# Patient Record
Sex: MALE | Race: WHITE | NOT HISPANIC OR LATINO | Employment: FULL TIME | URBAN - NONMETROPOLITAN AREA
[De-identification: names, ages, dates, MRNs, and addresses within clinical notes are randomized per-mention and may not be internally consistent; named-entity substitution may affect disease eponyms.]

---

## 2018-02-08 ENCOUNTER — OFFICE VISIT (OUTPATIENT)
Dept: FAMILY MEDICINE | Facility: OTHER | Age: 48
End: 2018-02-08

## 2018-02-08 VITALS
HEIGHT: 67 IN | OXYGEN SATURATION: 100 % | RESPIRATION RATE: 20 BRPM | SYSTOLIC BLOOD PRESSURE: 108 MMHG | DIASTOLIC BLOOD PRESSURE: 72 MMHG | TEMPERATURE: 96.9 F | WEIGHT: 149.2 LBS | HEART RATE: 105 BPM | BODY MASS INDEX: 23.42 KG/M2

## 2018-02-08 DIAGNOSIS — K62.5 RECTAL BLEEDING: ICD-10-CM

## 2018-02-08 DIAGNOSIS — R10.84 ABDOMINAL PAIN, GENERALIZED: ICD-10-CM

## 2018-02-08 DIAGNOSIS — R19.7 DIARRHEA, UNSPECIFIED TYPE: ICD-10-CM

## 2018-02-08 DIAGNOSIS — R21 RASH AND NONSPECIFIC SKIN ERUPTION: Primary | ICD-10-CM

## 2018-02-08 PROCEDURE — 99213 OFFICE O/P EST LOW 20 MIN: CPT | Performed by: NURSE PRACTITIONER

## 2018-02-08 RX ORDER — TRIAMCINOLONE ACETONIDE 1 MG/G
CREAM TOPICAL
Qty: 80 G | Refills: 0 | Status: SHIPPED | OUTPATIENT
Start: 2018-02-08 | End: 2021-03-26

## 2018-02-08 NOTE — PROGRESS NOTES
SUBJECTIVE:   Misha Nicole is a 47 year old male who presents to clinic today for the following health issues:      Gastrointestinal symptoms      Duration: 3-4 years    Description:           BLEEDING - bright red blood in the stool, bright red blood when wiping and water is red.      Intensity:  severe    Accompanying signs and symptoms:  loose stools, mucus in stools, painful bowel movements and bloating    History  Previous {similar problem: no   Previous evaluation:  none    Aggravating factors: meals and bowel movements    Alleviating factors: nothing    Other Therapies tried: None    He has been having abdominal pains, bloating, cramping and intermittent diarrhea and constipation for 3-4 years.  It is worsening and is now to the point of fecal incontinence and mucus discharge.  He is also having bright red bleeding with most stools and occasionally in between.   Drinks 1 pot of coffee a day.  Has not tried any dietary changes.  Works as an over the road .   Weight loss - he is unsure how much, but family has commented on this recently.      DERM/SKIN  -dry skin patches  -feet, legs, elbows    Diagnosed with psoriasis in the past.  Has never taken medications for this.        Problem list and histories reviewed & adjusted, as indicated.  Additional history: none    There is no problem list on file for this patient.    No past surgical history on file.    Social History   Substance Use Topics     Smoking status: Current Every Day Smoker     Packs/day: 0.75     Types: Cigarettes     Smokeless tobacco: Former User     Alcohol use Yes     No family history on file.      No current outpatient prescriptions on file.     Allergies   Allergen Reactions     No Known Drug Allergies      BP Readings from Last 3 Encounters:   02/08/18 108/72   10/14/04 98/60   02/27/03 122/88    Wt Readings from Last 3 Encounters:   02/08/18 149 lb 3.2 oz (67.7 kg)   10/14/04 149 lb (67.6 kg)   02/27/03 165 lb (74.8 kg)     "                Reviewed and updated as needed this visit by clinical staff  Tobacco  Allergies  Meds  Soc Hx      Reviewed and updated as needed this visit by Provider         ROS:  Constitutional, HEENT, cardiovascular, pulmonary, gi and gu systems are negative, except as otherwise noted.    OBJECTIVE:     /72  Pulse 105  Temp 96.9  F (36.1  C) (Tympanic)  Resp 20  Ht 5' 6.5\" (1.689 m)  Wt 149 lb 3.2 oz (67.7 kg)  SpO2 100%  BMI 23.72 kg/m2  Body mass index is 23.72 kg/(m^2).  GENERAL: alert, no distress and appears older than stated age  NECK: no adenopathy, no asymmetry, masses, or scars and thyroid normal to palpation  RESP: lungs clear to auscultation - no rales, rhonchi or wheezes  CV: regular rate and rhythm, normal S1 S2, no S3 or S4, no murmur, click or rub, no peripheral edema and peripheral pulses strong  ABDOMEN: soft,without hepatosplenomegaly or masses, tenderness LLQ and bowel sounds normal  MS: no gross musculoskeletal defects noted, no edema  SKIN: dry, scaling skin on right leg in several patches.      Diagnostic Test Results:  none     ASSESSMENT/PLAN:       1. Rash and nonspecific skin eruption  Will refer to dermatology for definitive diagnosis and treatment if this is psoriasis.  I did give him a steroid cream to use in the meantime.   - triamcinolone (KENALOG) 0.1 % cream; Apply sparingly to affected area three times daily as needed  Dispense: 80 g; Refill: 0  - DERMATOLOGY REFERRAL    2. Rectal bleeding  He needs a colonoscopy as soon as possible given his symptoms.  Unfortunately, he does not have insurance right now.  He just started a new job and thinks he will have coverage in about 2 months.  Will schedule as soon as he is able.   - GASTROENTEROLOGY ADULT REF PROCEDURE ONLY [facility] [phone]; No Provider Preference    3. Diarrhea, unspecified type  He needs a colonoscopy as soon as possible given his symptoms.  Unfortunately, he does not have insurance " right now.  He just started a new job and thinks he will have coverage in about 2 months.  Will schedule as soon as he is able.  He is going to use small amounts of Imodium PRN in the meantime.   - GASTROENTEROLOGY ADULT REF PROCEDURE ONLY [facility][note 3] [phone]; No Provider Preference    4. Abdominal pain, generalized  He needs a colonoscopy as soon as possible given his symptoms.  Unfortunately, he does not have insurance right now.  He just started a new job and thinks he will have coverage in about 2 months.  Will schedule as soon as he is able.  We did not do any further work up today due to his lack of insurance.  Will do further evaluation once that gets in place.   - GASTROENTEROLOGY ADULT REF PROCEDURE ONLY [facility][note 3] [phone]; No Provider Preference    See Patient Instructions    WILEY Waddell [facility]

## 2018-02-08 NOTE — MR AVS SNAPSHOT
After Visit Summary   2/8/2018    Misha Nicole    MRN: [id number]           Patient Information     Date Of Birth          1970        Visit Information        Provider Department      2/8/2018 7:00 AM Gail Vargas APRN [facility]        Today's Diagnoses     Rash and nonspecific skin eruption    -  1    Rectal bleeding        Diarrhea, unspecified type        Abdominal pain, generalized          Care Instructions    Use the steroid cream for your rash.      See Dermatology when you can.    Have the colonoscopy once you get insurance.     You can try Imodium in the meantime to help with diarrhea.                   Follow-ups after your visit        Additional Services     DERMATOLOGY REFERRAL       Your provider has referred you to: FMG: [facility] [phone]   [email or web address]  UMP: [facility] [phone]   [email or web address]    Or [facility]    Please be aware that coverage of these services is subject to the terms and limitations of your health insurance plan.  Call member services at your health plan with any benefit or coverage questions.      Please bring the following with you to your appointment:    (1) Any X-Rays, CTs or MRIs which have been performed.  Contact the facility where they were done to arrange for  prior to your scheduled appointment.    (2) List of current medications  (3) This referral request   (4) Any documents/labs given to you for this referral            GASTROENTEROLOGY ADULT REF PROCEDURE ONLY [facility] [phone]; No Provider Preference       Last Lab Result: No results found for: CR  Body mass index is 23.72 kg/(m^2).     Needed:  No  Language:  English    Patient will be contacted to schedule procedure.     Please be aware that coverage of these services is subject to  "the terms and limitations of your health insurance plan.  Call member services at your health plan with any benefit or coverage questions.  Any procedures must be performed at a [place] facility OR coordinated by your clinic's referral office.    Please bring the following with you to your appointment:    (1) Any X-Rays, CTs or MRIs which have been performed.  Contact the facility where they were done to arrange for  prior to your scheduled appointment.    (2) List of current medications   (3) This referral request   (4) Any documents/labs given to you for this referral                  Who to contact     If you have questions or need follow up information about today's clinic visit or your schedule please contact [facility] directly at [phone].  Normal or non-critical lab and imaging results will be communicated to you by MyChart, letter or phone within 4 business days after the clinic has received the results. If you do not hear from us within 7 days, please contact the clinic through [employer] or phone. If you have a critical or abnormal lab result, we will notify you by phone as soon as possible.  Submit refill requests through [employer] or call your pharmacy and they will forward the refill request to us. Please allow 3 business days for your refill to be completed.          Additional Information About Your Visit        [employer] Information     [employer] lets you send messages to your doctor, view your test results, renew your prescriptions, schedule appointments and more. To sign up, go to [email or web address] . Click on \"Log in\" on the left side of the screen, which will take you to the Welcome page. Then click on \"Sign up Now\" on the right side of the page.     You will be asked to enter the access code listed below, as well as some personal information. Please follow the directions to create your username and password.     Your access code is: [email or web address]  Expires: 5/9/2018  7:42 " "AM     Your access code will  in 90 days. If you need help or a new code, please call your [facility] clinic or [phone].        Care EveryWhere ID     This is your Care EveryWhere ID. This could be used by other organizations to access your [facility] medical records  [id number]        Your Vitals Were     Pulse Temperature Respirations Height Pulse Oximetry BMI (Body Mass Index)    105 96.9  F (36.1  C) (Tympanic) 20 5' 6.5\" (1.689 m) 100% 23.72 kg/m2       Blood Pressure from Last 3 Encounters:   18 108/72   10/14/04 98/60   03 122/88    Weight from Last 3 Encounters:   18 149 lb 3.2 oz (67.7 kg)   10/14/04 149 lb (67.6 kg)   03 165 lb (74.8 kg)              We Performed the Following     DERMATOLOGY REFERRAL     GASTROENTEROLOGY ADULT REF PROCEDURE ONLY [facility] [phone]; No Provider Preference          Today's Medication Changes          These changes are accurate as of 18  7:43 AM.  If you have any questions, ask your nurse or doctor.               Start taking these medicines.        Dose/Directions    triamcinolone 0.1 % cream   Commonly known as:  KENALOG   Used for:  Rash and nonspecific skin eruption   Started by:  Gail Vargas APRN CNP        Apply sparingly to affected area three times daily as needed   Quantity:  80 g   Refills:  0            Where to get your medicines      These medications were sent to [facility] [address]   [address] [facility] [address]     Phone:  [phone]     triamcinolone 0.1 % cream                Primary Care Provider Fax #    Physician No Ref-Primary [phone]       No address on file        Equal Access to Services     ALLISON GLEZ AH: Mirta Anaya, ruba sanchez, stevan kaalmada saad, chris smith. So [facility] [phone].    ATENCIÓN: Si habla español, tiene a vo disposición servicios gratuitos de asistencia lingüística. Llame al " [phone].    We comply with applicable federal civil rights laws and [place] laws. We do not discriminate on the basis of race, color, national origin, age, disability, sex, sexual orientation, or gender identity.            Thank you!     Thank you for choosing [facility]  for your care. Our goal is always to provide you with excellent care. Hearing back from our patients is one way we can continue to improve our services. Please take a few minutes to complete the written survey that you may receive in the mail after your visit with us. Thank you!             Your Updated Medication List - Protect others around you: Learn how to safely use, store and throw away your medicines at www.disposemymeds.org.          This list is accurate as of 2/8/18  7:43 AM.  Always use your most recent med list.                   Brand Name Dispense Instructions for use Diagnosis    triamcinolone 0.1 % cream    KENALOG    80 g    Apply sparingly to affected area three times daily as needed    Rash and nonspecific skin eruption

## 2018-02-08 NOTE — NURSING NOTE
"Chief Complaint   Patient presents with     Gastrointestinal Problem     loose stools, bleeding, gassy     Derm Problem     dry skin patches on feet, legs, elbows       Initial /72  Pulse 105  Temp 96.9  F (36.1  C) (Tympanic)  Resp 20  Ht 5' 6.5\" (1.689 m)  Wt 149 lb 3.2 oz (67.7 kg)  SpO2 100%  BMI 23.72 kg/m2 Estimated body mass index is 23.72 kg/(m^2) as calculated from the following:    Height as of this encounter: 5' 6.5\" (1.689 m).    Weight as of this encounter: 149 lb 3.2 oz (67.7 kg).  Medication Reconciliation: complete   ................Abelino Armstrong LPN,   February 8, 2018,      7:19 AM,   [facility]    "

## 2018-02-08 NOTE — PATIENT INSTRUCTIONS
Use the steroid cream for your rash.      See Dermatology when you can.    Have the colonoscopy once you get insurance.     You can try Imodium in the meantime to help with diarrhea.

## 2018-02-09 ENCOUNTER — TELEPHONE (OUTPATIENT)
Dept: FAMILY MEDICINE | Facility: OTHER | Age: 48
End: 2018-02-09

## 2018-02-09 NOTE — LETTER

## 2018-02-09 NOTE — LETTER
[facility]  [address] [facility] [address]  [phone]        February 9, 2018    Misha Nicole  [address] [facility] [address]

## 2018-04-03 ENCOUNTER — TELEPHONE (OUTPATIENT)
Dept: FAMILY MEDICINE | Facility: CLINIC | Age: 48
End: 2018-04-03

## 2018-04-03 ENCOUNTER — HOSPITAL ENCOUNTER (OUTPATIENT)
Facility: CLINIC | Age: 48
Discharge: HOME | End: 2018-04-03
Attending: SURGERY | Admitting: SURGERY
Payer: COMMERCIAL

## 2018-04-03 ENCOUNTER — SURGERY (OUTPATIENT)
Age: 48
End: 2018-04-03

## 2018-04-03 VITALS
RESPIRATION RATE: 16 BRPM | DIASTOLIC BLOOD PRESSURE: 77 MMHG | OXYGEN SATURATION: 100 % | TEMPERATURE: 98.2 F | SYSTOLIC BLOOD PRESSURE: 124 MMHG

## 2018-04-03 DIAGNOSIS — R07.9 CHEST PAIN, UNSPECIFIED TYPE: Primary | ICD-10-CM

## 2018-04-03 PROCEDURE — 25000128 H RX IP 250 OP 636: Performed by: NURSE ANESTHETIST, CERTIFIED REGISTERED

## 2018-04-03 PROCEDURE — 40000879 ZZH CANCELLED SURGERY UP TO 16-30 MINS

## 2018-04-03 PROCEDURE — 25000125 ZZHC RX 250: Performed by: NURSE ANESTHETIST, CERTIFIED REGISTERED

## 2018-04-03 RX ORDER — LIDOCAINE 40 MG/G
CREAM TOPICAL
Status: DISCONTINUED | OUTPATIENT
Start: 2018-04-03 | End: 2018-04-03 | Stop reason: HOSPADM

## 2018-04-03 RX ORDER — ONDANSETRON 2 MG/ML
4 INJECTION INTRAMUSCULAR; INTRAVENOUS
Status: DISCONTINUED | OUTPATIENT
Start: 2018-04-03 | End: 2018-04-03 | Stop reason: HOSPADM

## 2018-04-03 RX ORDER — SODIUM CHLORIDE, SODIUM LACTATE, POTASSIUM CHLORIDE, CALCIUM CHLORIDE 600; 310; 30; 20 MG/100ML; MG/100ML; MG/100ML; MG/100ML
INJECTION, SOLUTION INTRAVENOUS CONTINUOUS
Status: DISCONTINUED | OUTPATIENT
Start: 2018-04-03 | End: 2018-04-03 | Stop reason: HOSPADM

## 2018-04-03 RX ADMIN — SODIUM CHLORIDE, POTASSIUM CHLORIDE, SODIUM LACTATE AND CALCIUM CHLORIDE: 600; 310; 30; 20 INJECTION, SOLUTION INTRAVENOUS at 07:57

## 2018-04-03 RX ADMIN — LIDOCAINE HYDROCHLORIDE 1 ML: 10 INJECTION, SOLUTION EPIDURAL; INFILTRATION; INTRACAUDAL at 07:57

## 2018-04-03 NOTE — TELEPHONE ENCOUNTER
": 1970  PHONE #'s: [phone] (home)     PRESENTING PROBLEM:  Rectal bleeding, intermittent for months , alternating with constipation. Pt was supposed to see Dr. Cook for a colonoscopy today as well as Dr. Richmond in clinic for these Sx, but he had to cancel due to having diarrhea and abd pains from the prep. He feels like he can't leave the bathroom to come in for either appt.     NURSING ASSESSMENT:  I started asking questions about his health. He basically left [place] years ago- moved to [place]. He did not seek medical care there for financial reasons. He said he couldn't afford it. He returned to [place] in 2017. He has ONLY seen Gail Vargas, at [facility],to discuss his concerns, 18. He was scheduled today for the colonoscopy.  \" Dr. 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 I have a double heart murmur\"   He says he does NOT remember the name of the cardiologist he saw in [place] for this. HE was last seen in  for this. He thinks the last time he saw a provider in [place] was .  NOTE: He was not a good historian. He didn't recall names of clinics or providers whom he has seen in the past. He didn't think he would even know how to contact to tget the information for release of medical information.    Description:  He said he has had bright red blood with his BMs that will fill up the toilet bowl water to look all red.  This has been going on for some time now. His last bloody stool was yesterday. He has been cutting kotex pads in half and placing in the rectum and into his underpants. Wearing 2 pairs of underwear as he said he will soak through. The pad He is leaking blood from his rectum without BMs now. ( he was embarrassed to tell me that)  Onset/duration:  \" Months\"  He alternated between constipation issues, too. \" I hadn't pooped for 3 weeks . I just kept taking oral stool softeners until something worked finally " "Didn't seek medical care. .   Precip. factors:   Etiology unknown  Assoc. Sx:   Tired out,  Has constant abdominal pain.  He has lost 25 to 30 pounds in the last while.( since September?)  \" I used to weigh 185# to 190 # in 2016.  Improves/worsens Sx:  WORSE.  Pain scale (1-10)   3/10 right now.   Sx specific meds:  NONE, \" Just that prep I had to take for the colonscopy. \"   Last exam/Tx:  2/8/18 Gail Vargas at [facility].     RECOMMENDED DISPOSITION:  To ED, another person to drive - or call 911 if you have NO mode of transportation. Gave him the number to the [employer] bus service for [facility] residenc: [phone] or can call [phone].    Will comply with recommendation: YES  If further questions/concerns or if Sx do not improve, worsen or new Sx develop, call your PCP or [facility] as soon as possible.    NOTES:  Disposition was determined by the first positive assessment question, therefore all previous assessment questions were negative.  Informed to check provider manual or call insurance company to assure coverage.    Guideline used: Rectal bleeding / Abdominal Pain- Adult.  Telephone Triage Protocols for Nurses, Fifth Edition, Melania Limon RN  April 3, 2018    "

## 2018-04-03 NOTE — OR NURSING
After Dr. Cook saw pt. He decided to cancel procedure & have pt. See physician today.  Appt. Mad with Dr. Richmond today at 1120 here at [place].  Pt. Strongly encouraged to attend the appt. to start cardiac workup. Bryant MACE CAPA

## 2018-04-03 NOTE — INTERIM SUMMARY
Misha came in today prepped for a colonoscopy due to some recent GI issues. He admits to some significant cardiac issues in the past. He states that he has been recommended to have stents and even a potential heart transplant candidate but he never followed up with a cardiologist. He experiences shortness of breath with exertion and had periodic chest pains. He hasn't had any sedation or anesthesia since 80s. He has murmurs on exam. Although he definitely needs a colonoscopy in the future because of his symptoms, I postponed the procedure in order to evaluate his cardiac status. He needs cardiac workup and clearance for sedation and anesthesia before we would do the procedure.

## 2018-04-10 ENCOUNTER — TELEPHONE (OUTPATIENT)
Dept: SURGERY | Facility: CLINIC | Age: 48
End: 2018-04-10

## 2018-04-10 NOTE — TELEPHONE ENCOUNTER
Reason for Call:  Other call back    Detailed comments: patient was in for colonoscopy and it was determined that patient needed to have cardiac clearance before performing procedure.  Is Dr. Cook wanting a preop or patient to see a cardiac specialist.      Phone Number Patient can be reached at: Cell number on file:    Telephone Information:   Mobile [phone]       Best Time: morning time after 9 am    Can we leave a detailed message on this number? YES    Call taken on 4/10/2018 at 10:28 AM by Mae Mandel

## 2018-04-14 ENCOUNTER — APPOINTMENT (OUTPATIENT)
Dept: CT IMAGING | Facility: CLINIC | Age: 48
End: 2018-04-14
Attending: FAMILY MEDICINE
Payer: COMMERCIAL

## 2018-04-14 ENCOUNTER — APPOINTMENT (OUTPATIENT)
Dept: GENERAL RADIOLOGY | Facility: CLINIC | Age: 48
End: 2018-04-14
Attending: FAMILY MEDICINE
Payer: COMMERCIAL

## 2018-04-14 ENCOUNTER — HOSPITAL ENCOUNTER (EMERGENCY)
Facility: CLINIC | Age: 48
Discharge: HOME | End: 2018-04-14
Attending: FAMILY MEDICINE | Admitting: FAMILY MEDICINE
Payer: COMMERCIAL

## 2018-04-14 VITALS
TEMPERATURE: 101.1 F | OXYGEN SATURATION: 98 % | SYSTOLIC BLOOD PRESSURE: 118 MMHG | DIASTOLIC BLOOD PRESSURE: 79 MMHG | WEIGHT: 145 LBS | BODY MASS INDEX: 23.05 KG/M2 | HEART RATE: 99 BPM | RESPIRATION RATE: 18 BRPM

## 2018-04-14 DIAGNOSIS — F17.200 TOBACCO DEPENDENCE SYNDROME: ICD-10-CM

## 2018-04-14 DIAGNOSIS — R50.9 FEBRILE ILLNESS, ACUTE: ICD-10-CM

## 2018-04-14 DIAGNOSIS — J06.9 VIRAL UPPER RESPIRATORY ILLNESS: ICD-10-CM

## 2018-04-14 DIAGNOSIS — R19.7 BLOODY DIARRHEA: ICD-10-CM

## 2018-04-14 DIAGNOSIS — K52.9 CHRONIC DIARRHEA: ICD-10-CM

## 2018-04-14 LAB
ALBUMIN SERPL-MCNC: 3.2 G/DL (ref 3.4–5)
ALBUMIN UR-MCNC: NEGATIVE MG/DL
ALP SERPL-CCNC: 89 U/L (ref 40–150)
ALT SERPL W P-5'-P-CCNC: 21 U/L (ref 0–70)
ANION GAP SERPL CALCULATED.3IONS-SCNC: 8 MMOL/L (ref 3–14)
APPEARANCE UR: CLEAR
AST SERPL W P-5'-P-CCNC: 21 U/L (ref 0–45)
BASE EXCESS BLDV CALC-SCNC: 1.3 MMOL/L
BASOPHILS # BLD AUTO: 0 10E9/L (ref 0–0.2)
BASOPHILS NFR BLD AUTO: 0.7 %
BILIRUB SERPL-MCNC: 0.3 MG/DL (ref 0.2–1.3)
BILIRUB UR QL STRIP: NEGATIVE
BUN SERPL-MCNC: 12 MG/DL (ref 7–30)
CALCIUM SERPL-MCNC: 8.2 MG/DL (ref 8.5–10.1)
CHLORIDE SERPL-SCNC: 107 MMOL/L (ref 94–109)
CO2 SERPL-SCNC: 25 MMOL/L (ref 20–32)
COLOR UR AUTO: YELLOW
CREAT SERPL-MCNC: 0.62 MG/DL (ref 0.66–1.25)
CRP SERPL-MCNC: 38.9 MG/L (ref 0–8)
DIFFERENTIAL METHOD BLD: NORMAL
EOSINOPHIL # BLD AUTO: 0.1 10E9/L (ref 0–0.7)
EOSINOPHIL NFR BLD AUTO: 2.1 %
ERYTHROCYTE [DISTWIDTH] IN BLOOD BY AUTOMATED COUNT: 13.7 % (ref 10–15)
FLUAV+FLUBV AG SPEC QL: NEGATIVE
FLUAV+FLUBV AG SPEC QL: NEGATIVE
GFR SERPL CREATININE-BSD FRML MDRD: >90 ML/MIN/1.7M2
GLUCOSE SERPL-MCNC: 79 MG/DL (ref 70–99)
GLUCOSE UR STRIP-MCNC: NEGATIVE MG/DL
HCO3 BLDV-SCNC: 27 MMOL/L (ref 21–28)
HCT VFR BLD AUTO: 41 % (ref 40–53)
HGB BLD-MCNC: 13.9 G/DL (ref 13.3–17.7)
HGB UR QL STRIP: ABNORMAL
IMM GRANULOCYTES # BLD: 0 10E9/L (ref 0–0.4)
IMM GRANULOCYTES NFR BLD: 0 %
KETONES UR STRIP-MCNC: NEGATIVE MG/DL
LACTATE BLD-SCNC: 0.9 MMOL/L (ref 0.7–2)
LEUKOCYTE ESTERASE UR QL STRIP: NEGATIVE
LIPASE SERPL-CCNC: 231 U/L (ref 73–393)
LYMPHOCYTES # BLD AUTO: 1.1 10E9/L (ref 0.8–5.3)
LYMPHOCYTES NFR BLD AUTO: 26 %
MCH RBC QN AUTO: 31.2 PG (ref 26.5–33)
MCHC RBC AUTO-ENTMCNC: 33.9 G/DL (ref 31.5–36.5)
MCV RBC AUTO: 92 FL (ref 78–100)
MONOCYTES # BLD AUTO: 0.5 10E9/L (ref 0–1.3)
MONOCYTES NFR BLD AUTO: 11 %
NEUTROPHILS # BLD AUTO: 2.6 10E9/L (ref 1.6–8.3)
NEUTROPHILS NFR BLD AUTO: 60.2 %
NITRATE UR QL: NEGATIVE
NT-PROBNP SERPL-MCNC: 353 PG/ML (ref 0–900)
O2/TOTAL GAS SETTING VFR VENT: 21 %
PCO2 BLDV: 44 MM HG (ref 40–50)
PH BLDV: 7.39 PH (ref 7.32–7.43)
PH UR STRIP: 6 PH (ref 5–7)
PLATELET # BLD AUTO: 182 10E9/L (ref 150–450)
PO2 BLDV: 37 MM HG (ref 25–47)
POTASSIUM SERPL-SCNC: 3.4 MMOL/L (ref 3.4–5.3)
PROT SERPL-MCNC: 7.2 G/DL (ref 6.8–8.8)
RBC # BLD AUTO: 4.46 10E12/L (ref 4.4–5.9)
RBC #/AREA URNS AUTO: ABNORMAL /HPF (ref 0–2)
SODIUM SERPL-SCNC: 140 MMOL/L (ref 133–144)
SOURCE: ABNORMAL
SP GR UR STRIP: 1.02 (ref 1–1.03)
SPECIMEN SOURCE: NORMAL
UROBILINOGEN UR STRIP-MCNC: 0 MG/DL (ref 0–2)
WBC # BLD AUTO: 4.3 10E9/L (ref 4–11)
WBC #/AREA URNS AUTO: ABNORMAL /HPF (ref 0–5)

## 2018-04-14 PROCEDURE — 85025 COMPLETE CBC W/AUTO DIFF WBC: CPT | Performed by: FAMILY MEDICINE

## 2018-04-14 PROCEDURE — 25000128 H RX IP 250 OP 636: Performed by: FAMILY MEDICINE

## 2018-04-14 PROCEDURE — 83605 ASSAY OF LACTIC ACID: CPT | Performed by: FAMILY MEDICINE

## 2018-04-14 PROCEDURE — 93010 ELECTROCARDIOGRAM REPORT: CPT | Mod: Z6 | Performed by: FAMILY MEDICINE

## 2018-04-14 PROCEDURE — 74177 CT ABD & PELVIS W/CONTRAST: CPT

## 2018-04-14 PROCEDURE — 83690 ASSAY OF LIPASE: CPT | Performed by: FAMILY MEDICINE

## 2018-04-14 PROCEDURE — 82803 BLOOD GASES ANY COMBINATION: CPT | Performed by: FAMILY MEDICINE

## 2018-04-14 PROCEDURE — 96361 HYDRATE IV INFUSION ADD-ON: CPT | Performed by: FAMILY MEDICINE

## 2018-04-14 PROCEDURE — 99285 EMERGENCY DEPT VISIT HI MDM: CPT | Mod: 25 | Performed by: FAMILY MEDICINE

## 2018-04-14 PROCEDURE — 80053 COMPREHEN METABOLIC PANEL: CPT | Performed by: FAMILY MEDICINE

## 2018-04-14 PROCEDURE — 87040 BLOOD CULTURE FOR BACTERIA: CPT | Performed by: FAMILY MEDICINE

## 2018-04-14 PROCEDURE — 83880 ASSAY OF NATRIURETIC PEPTIDE: CPT | Performed by: FAMILY MEDICINE

## 2018-04-14 PROCEDURE — 93005 ELECTROCARDIOGRAM TRACING: CPT | Performed by: FAMILY MEDICINE

## 2018-04-14 PROCEDURE — 86140 C-REACTIVE PROTEIN: CPT | Performed by: FAMILY MEDICINE

## 2018-04-14 PROCEDURE — 81001 URINALYSIS AUTO W/SCOPE: CPT | Performed by: FAMILY MEDICINE

## 2018-04-14 PROCEDURE — 71046 X-RAY EXAM CHEST 2 VIEWS: CPT | Mod: TC

## 2018-04-14 PROCEDURE — 87804 INFLUENZA ASSAY W/OPTIC: CPT | Performed by: FAMILY MEDICINE

## 2018-04-14 PROCEDURE — 96374 THER/PROPH/DIAG INJ IV PUSH: CPT | Mod: 59 | Performed by: FAMILY MEDICINE

## 2018-04-14 RX ORDER — IOPAMIDOL 755 MG/ML
100 INJECTION, SOLUTION INTRAVASCULAR ONCE
Status: COMPLETED | OUTPATIENT
Start: 2018-04-14 | End: 2018-04-14

## 2018-04-14 RX ORDER — DIPHENOXYLATE HCL/ATROPINE 2.5-.025MG
1 TABLET ORAL ONCE
Status: DISCONTINUED | OUTPATIENT
Start: 2018-04-14 | End: 2018-04-14

## 2018-04-14 RX ORDER — ONDANSETRON 2 MG/ML
4 INJECTION INTRAMUSCULAR; INTRAVENOUS EVERY 30 MIN PRN
Status: DISCONTINUED | OUTPATIENT
Start: 2018-04-14 | End: 2018-04-14 | Stop reason: HOSPADM

## 2018-04-14 RX ORDER — SODIUM CHLORIDE 9 MG/ML
1000 INJECTION, SOLUTION INTRAVENOUS CONTINUOUS
Status: DISCONTINUED | OUTPATIENT
Start: 2018-04-14 | End: 2018-04-14

## 2018-04-14 RX ORDER — ALBUTEROL SULFATE 90 UG/1
2 AEROSOL, METERED RESPIRATORY (INHALATION) EVERY 6 HOURS
Qty: 1 INHALER | Refills: 0 | Status: SHIPPED | OUTPATIENT
Start: 2018-04-14 | End: 2018-06-06

## 2018-04-14 RX ORDER — HYDROMORPHONE HYDROCHLORIDE 1 MG/ML
0.5 INJECTION, SOLUTION INTRAMUSCULAR; INTRAVENOUS; SUBCUTANEOUS
Status: DISCONTINUED | OUTPATIENT
Start: 2018-04-14 | End: 2018-04-14 | Stop reason: HOSPADM

## 2018-04-14 RX ORDER — PREDNISONE 20 MG/1
20 TABLET ORAL 2 TIMES DAILY
Qty: 10 TABLET | Refills: 0 | Status: SHIPPED | OUTPATIENT
Start: 2018-04-14 | End: 2018-04-19

## 2018-04-14 RX ADMIN — SODIUM CHLORIDE 1000 ML: 9 INJECTION, SOLUTION INTRAVENOUS at 19:07

## 2018-04-14 RX ADMIN — IOPAMIDOL 71 ML: 755 INJECTION, SOLUTION INTRAVENOUS at 19:54

## 2018-04-14 RX ADMIN — ONDANSETRON 4 MG: 2 INJECTION INTRAMUSCULAR; INTRAVENOUS at 19:08

## 2018-04-14 ASSESSMENT — ENCOUNTER SYMPTOMS
FEVER: 1
DIARRHEA: 1
COUGH: 1
CONSTIPATION: 1
TREMORS: 1
HEMATURIA: 0
VOMITING: 1
BACK PAIN: 1
ABDOMINAL PAIN: 1
NAUSEA: 1
BLOOD IN STOOL: 1
UNEXPECTED WEIGHT CHANGE: 1

## 2018-04-14 NOTE — ED AVS SNAPSHOT
[facility] Emergency Department    [address] [place] [address]    Phone:  [phone]    Fax:  [phone]                                       Misha Nicole   MRN: [id number]    Department:  [facility] Emergency Department   Date of Visit:  4/14/2018           After Visit Summary Signature Page     I have received my discharge instructions, and my questions have been answered. I have discussed any challenges I see with this plan with the nurse or doctor.    ..........................................................................................................................................  Patient/Patient Representative Signature      ..........................................................................................................................................  Patient Representative Print Name and Relationship to Patient    ..................................................               ................................................  Date                                            Time    ..........................................................................................................................................  Reviewed by Signature/Title    ...................................................              ..............................................  Date                                                            Time

## 2018-04-14 NOTE — ED AVS SNAPSHOT
[facility] Emergency Department    [address] [place] [address]    Phone:  [phone]    Fax:  [phone]                                       Misha Nicole   MRN: [id number]    Department:  [facility] Emergency Department   Date of Visit:  4/14/2018           Patient Information     Date Of Birth          1970        Your diagnoses for this visit were:     Febrile illness, acute     Viral upper respiratory illness     Tobacco dependence syndrome     Chronic diarrhea     Bloody diarrhea        You were seen by Sidra, Tyler NAVA MD.      Follow-up Information     Follow up with No Ref-Primary, Physician In 1 week.        Follow up with [facility] Emergency Department.    Specialty:  EMERGENCY MEDICINE    Why:  If symptoms worsen    Contact information:    [address]   [place] [address] [phone]    Additional information:    From [address]: Exit at [address] on [place] side of [place]. Turn right on [address]. Turn left at stoplight on [address]. [facility] will be in view two blocks ahead        Discharge Instructions       I recommend you follow-up and complete a colonoscopy. I also recommend you have stool studies for culture and parasites. I also recommend you follow-up for a cardiac stress test and echocardiogram. Prednisone 20 mg twice a day for 5 days. Use your inhaler 2 puffs every 4 hours as needed for breathing and cough. Follow-up next week with your clinic doctor.    Discharge References/Attachments     DIARRHEA, UNKNOWN CAUSE (ENGLISH)      24 Hour Appointment Hotline       To make an appointment at any [facility], call [phone]). If you don't have a family doctor or clinic, we will help you find one. [facility] are conveniently located to serve the needs of you and your family.             Review of your medicines      START taking        Dose / Directions Last dose taken     albuterol 108 (90 Base) MCG/ACT Inhaler   Commonly known as:  PROAIR HFA/PROVENTIL HFA/VENTOLIN HFA   Dose:  2 puff   Quantity:  1 Inhaler        Inhale 2 puffs into the lungs every 6 hours for 10 days   Refills:  0        predniSONE 20 MG tablet   Commonly known as:  DELTASONE   Dose:  20 mg   Quantity:  10 tablet        Take 1 tablet (20 mg) by mouth 2 times daily for 5 days   Refills:  0          Our records show that you are taking the medicines listed below. If these are incorrect, please call your family doctor or clinic.        Dose / Directions Last dose taken    triamcinolone 0.1 % cream   Commonly known as:  KENALOG   Quantity:  80 g        Apply sparingly to affected area three times daily as needed   Refills:  0                Prescriptions were sent or printed at these locations (2 Prescriptions)                   Other Prescriptions                Printed at Department/Unit printer (2 of 2)         predniSONE (DELTASONE) 20 MG tablet               albuterol (PROAIR HFA/PROVENTIL HFA/VENTOLIN HFA) 108 (90 Base) MCG/ACT Inhaler                Procedures and tests performed during your visit     Procedure/Test Number of Times Performed    Blood culture 2    Blood gas venous 1    CBC with platelets differential 1    CRP inflammation 1    CT Abdomen Pelvis w Contrast 1    Comprehensive metabolic panel 1    EKG 12-lead, tracing only 1    Give 20 ounces of water 15 minutes before CT of abdomen 1    Influenza A/B antigen 1    Lactic acid whole blood 1    Lipase 1    Nt probnp inpatient (BNP) 1    Peripheral IV catheter 1    UA with Microscopic 1    XR Chest 2 Views 1      Orders Needing Specimen Collection     None      Pending Results     Date and Time Order Name Status Description    4/14/2018 1832 Blood culture In process     4/14/2018 1832 Blood culture In process     4/14/2018 1832 UA with Microscopic In process             Pending Culture Results     Date and Time Order Name Status Description     "2018 1832 Blood culture In process     2018 1832 Blood culture In process     2018 1832 UA with Microscopic In process             Pending Results Instructions     If you had any lab results that were not finalized at the time of your Discharge, you can call the ED Lab Result RN at [phone]. You will be contacted by this team for any positive Lab results or changes in treatment. The nurses are available 7 days a week from 10A to 6:30P.  You can leave a message 24 hours per day and they will return your call.        Thank you for choosing [facility]       Thank you for choosing [facility] for your care. Our goal is always to provide you with excellent care. Hearing back from our patients is one way we can continue to improve our services. Please take a few minutes to complete the written survey that you may receive in the mail after you visit with us. Thank you!        [email or web address] Information     [employer] lets you send messages to your doctor, view your test results, renew your prescriptions, schedule appointments and more. To sign up, go to [email or web address] . Click on \"Log in\" on the left side of the screen, which will take you to the Welcome page. Then click on \"Sign up Now\" on the right side of the page.     You will be asked to enter the access code listed below, as well as some personal information. Please follow the directions to create your username and password.     Your access code is: [email or web address]  Expires: 2018  8:42 AM     Your access code will  in 90 days. If you need help or a new code, please call your [facility] clinic or [phone].        Care EveryWhere ID     This is your Care EveryWhere ID. This could be used by other organizations to access your [facility] medical records  [id number]        Equal Access to Services     ALLISON GLEZ AH: ruba Wright, chris mari. So [facility] " [phone].    ATENCIÓN: Si habla español, tiene a vo disposición servicios gratuitos de asistencia lingüística. Llame al [phone].    We comply with applicable federal civil rights laws and [place] laws. We do not discriminate on the basis of race, color, national origin, age, disability, sex, sexual orientation, or gender identity.            After Visit Summary       This is your record. Keep this with you and show to your community pharmacist(s) and doctor(s) at your next visit.

## 2018-04-14 NOTE — ED NOTES
"Pt c/o cough, congestion, fever, body aches all wk.  He chronic abd pain w/constipation and n/v today.  He was to have had a colonoscopy earlier this month, but was told they were concerned about his heart as he had reported chest pains.  He has been having chest pains off/on x years and was told he needed stents and a \"heart transplant.\"  He has not seen a doctor for years.  He states his most problematic area is his stomach.  Reports taking a lot of motrin.    "

## 2018-04-14 NOTE — ED PROVIDER NOTES
"  History     Chief Complaint   Patient presents with     Fever     Abdominal Pain     The history is provided by the patient.     Misha Nicole is a 48 year old male who presents to the ED complaining of abdominal pain and a fever. Patient developed a fever on Monday and continued to experience bloody stools. He took work off on Monday but worked the rest of the week despite the ongoing fever and diarrhea. Patient reports he comes into the ED today because he is not allowed to miss work for an extended amount of time. Patient's abdominal pain location has varied throughout the years but is now located at his right upper quadrant. He complains of constipation, his last bowel movement was yesterday. He complains of chronic lower back pain, nausea, and tremors. He vomited when he was coughing severely. He is congested with a productive cough.    Patient states he has been experiencing bloody diarrhea and abdominal pain intermittently for the past 8 years. He typically wears a pad and double layers of underwear due to his chronic diarrhea. A cardiologist told him in 1991 that he had a \"90 year-old's heart\" and needed a transplant or stents in 4 years. He was put on blood thinners at that time but is no longer taking any medications. He has a long history of coughing, it has increased this past week. Patient continues to smoke tobacco. Patient denies hematuria and history/family history of ulcerative colitis. He has lost 50 lbs during the 8 years of chronic bloody diarrhea and abdominal pain. Patient had a colonoscopy scheduled early April but was canceled as he was complaining of his continued chronic chest pains. Patient has no past medical history. He is allergic to codeine and nickel.       Problem List:    There are no active problems to display for this patient.       Past Medical History:    No past medical history on file.    Past Surgical History:    Past Surgical History:   Procedure Laterality Date     " ORTHOPEDIC SURGERY  1985 &1996    Right & Left knee replacements       Family History:    No family history on file.    Social History:  Marital Status:  Single [1]  Social History   Substance Use Topics     Smoking status: Current Every Day Smoker     Packs/day: 0.50     Types: Cigarettes     Smokeless tobacco: Current User     Types: Chew     Alcohol use Yes        Medications:      predniSONE (DELTASONE) 20 MG tablet   albuterol (PROAIR HFA/PROVENTIL HFA/VENTOLIN HFA) 108 (90 Base) MCG/ACT Inhaler   triamcinolone (KENALOG) 0.1 % cream         Review of Systems   Constitutional: Positive for fever and unexpected weight change (lost 50 lbs over past 8 years).   HENT: Positive for congestion.    Respiratory: Positive for cough (productive).    Cardiovascular: Positive for chest pain (chronic heart issues).   Gastrointestinal: Positive for abdominal pain (right upper), blood in stool, constipation, diarrhea, nausea and vomiting (after coughing severely).   Genitourinary: Negative for hematuria.   Musculoskeletal: Positive for back pain (lower, chronic).   Neurological: Positive for tremors.   All other systems reviewed and are negative.      Physical Exam   BP: 135/84  Pulse: 99  Temp: 101.1  F (38.4  C)  Resp: 18  Weight: 65.8 kg (145 lb)  SpO2: 100 %      Physical Exam   Constitutional: He is oriented to person, place, and time. No distress.   Alert pleasant smiling febrile thin male who appears older than stated age. He has frequent cough-nonproductive. He is otherwise breathing at ease. O2 sats are normal on room air./84  Pulse 99  Temp 101.1  F (38.4  C) (Temporal)  Resp 18  Wt 65.8 kg (145 lb)  SpO2 100%  BMI 23.05 kg/m2     HENT:   Head: Normocephalic.   Right Ear: External ear normal.   Left Ear: External ear normal.   Nose: Nose normal.   Mouth/Throat: Oropharynx is clear and moist.   Eyes: Conjunctivae are normal.   Neck: Normal range of motion. Neck supple.   Cardiovascular: Normal rate,  regular rhythm and normal heart sounds.    Pulmonary/Chest: Effort normal and breath sounds normal.   Abdominal: Soft. Bowel sounds are normal. There is no tenderness.   Musculoskeletal: Normal range of motion.   Neurological: He is alert and oriented to person, place, and time.   Skin: Skin is warm and dry.   Psychiatric: He has a normal mood and affect. His behavior is normal.   Nursing note and vitals reviewed.      ED Course            EKG Interpretation:      Interpreted by Tyler Valente   Symptoms at time of EKG: None   Rhythm: Normal sinus --one PVC  Rate: Normal  Axis: Normal  Ectopy: None  Conduction: Left anterior hemiblock  ST Segments/ T Waves: No ST-T wave changes and No acute ischemic changes  Q Waves: None  Comparison to prior: No old EKG available    Clinical Impression: normal EKG--normal sinus rhythm at a rate of 83. Left anterior hemiblock. PVC.      ED Course     Results for orders placed or performed during the hospital encounter of 04/14/18 (from the past 48 hour(s))   CBC with platelets differential   Result Value Ref Range    WBC 4.3 4.0 - 11.0 10e9/L    RBC Count 4.46 4.4 - 5.9 10e12/L    Hemoglobin 13.9 13.3 - 17.7 g/dL    Hematocrit 41.0 40.0 - 53.0 %    MCV 92 78 - 100 fl    MCH 31.2 26.5 - 33.0 pg    MCHC 33.9 31.5 - 36.5 g/dL    RDW 13.7 10.0 - 15.0 %    Platelet Count 182 150 - 450 10e9/L    Diff Method Automated Method     % Neutrophils 60.2 %    % Lymphocytes 26.0 %    % Monocytes 11.0 %    % Eosinophils 2.1 %    % Basophils 0.7 %    % Immature Granulocytes 0.0 %    Absolute Neutrophil 2.6 1.6 - 8.3 10e9/L    Absolute Lymphocytes 1.1 0.8 - 5.3 10e9/L    Absolute Monocytes 0.5 0.0 - 1.3 10e9/L    Absolute Eosinophils 0.1 0.0 - 0.7 10e9/L    Absolute Basophils 0.0 0.0 - 0.2 10e9/L    Abs Immature Granulocytes 0.0 0 - 0.4 10e9/L   Comprehensive metabolic panel   Result Value Ref Range    Sodium 140 133 - 144 mmol/L    Potassium 3.4 3.4 - 5.3 mmol/L    Chloride 107 94 - 109 mmol/L     Carbon Dioxide 25 20 - 32 mmol/L    Anion Gap 8 3 - 14 mmol/L    Glucose 79 70 - 99 mg/dL    Urea Nitrogen 12 7 - 30 mg/dL    Creatinine 0.62 (L) 0.66 - 1.25 mg/dL    GFR Estimate >90 >60 mL/min/1.7m2    GFR Estimate If Black >90 >60 mL/min/1.7m2    Calcium 8.2 (L) 8.5 - 10.1 mg/dL    Bilirubin Total 0.3 0.2 - 1.3 mg/dL    Albumin 3.2 (L) 3.4 - 5.0 g/dL    Protein Total 7.2 6.8 - 8.8 g/dL    Alkaline Phosphatase 89 40 - 150 U/L    ALT 21 0 - 70 U/L    AST 21 0 - 45 U/L   Lipase   Result Value Ref Range    Lipase 231 73 - 393 U/L   Blood gas venous   Result Value Ref Range    Ph Venous 7.39 7.32 - 7.43 pH    PCO2 Venous 44 40 - 50 mm Hg    PO2 Venous 37 25 - 47 mm Hg    Bicarbonate Venous 27 21 - 28 mmol/L    Base Excess Venous 1.3 mmol/L    FIO2 21    Nt probnp inpatient (BNP)   Result Value Ref Range    N-Terminal Pro BNP Inpatient 353 0 - 900 pg/mL   Lactic acid whole blood   Result Value Ref Range    Lactic Acid 0.9 0.7 - 2.0 mmol/L   CRP inflammation   Result Value Ref Range    CRP Inflammation 38.9 (H) 0.0 - 8.0 mg/L   Influenza A/B antigen   Result Value Ref Range    Influenza A/B Agn Specimen Nasopharyngeal     Influenza A Negative NEG^Negative    Influenza B Negative NEG^Negative   XR Chest 2 Views    Narrative    XR CHEST 2 VW 4/14/2018 7:46 PM    HISTORY: Pain.    COMPARISON: None.      Impression    IMPRESSION: The lungs are clear. No focal pulmonary opacities. Heart  and mediastinum are unremarkable. No acute cardiopulmonary  abnormalities.    NOE YAN MD   CT Abdomen Pelvis w Contrast    Narrative    CT ABDOMEN PELVIS W CONTRAST 4/14/2018 8:02 PM    HISTORY: Abdominal pain.    TECHNIQUE: CT of the abdomen and pelvis is performed with 71mL,  Isovue-370 intravenously. Radiation dose for this scan was reduced  using automated exposure control, adjustment of the mA and/or kV  according to patient size, or iterative reconstruction technique.    COMPARISON: None.    FINDINGS:    Liver:  Normal.  Gallbladder:Normal.  Pancreas:Normal.  Spleen:Normal.  Adrenals:Normal.  Ascites:None.    Kidneys:Normal.  Bladder:Normal.  Pelvic free fluid:None.    Bowels:Unremarkable.  No evidence of obstruction.  Appendix:Normal.    Abdominal or pelvic lymphadenopathy:None.    Miscellaneous findings:None.      Impression    IMPRESSION:  Unremarkable CT scan of the abdomen and pelvis.    NOE YAN MD       Procedures               Critical Care time:  none               Results for orders placed or performed during the hospital encounter of 04/14/18 (from the past 24 hour(s))   CBC with platelets differential   Result Value Ref Range    WBC 4.3 4.0 - 11.0 10e9/L    RBC Count 4.46 4.4 - 5.9 10e12/L    Hemoglobin 13.9 13.3 - 17.7 g/dL    Hematocrit 41.0 40.0 - 53.0 %    MCV 92 78 - 100 fl    MCH 31.2 26.5 - 33.0 pg    MCHC 33.9 31.5 - 36.5 g/dL    RDW 13.7 10.0 - 15.0 %    Platelet Count 182 150 - 450 10e9/L    Diff Method Automated Method     % Neutrophils 60.2 %    % Lymphocytes 26.0 %    % Monocytes 11.0 %    % Eosinophils 2.1 %    % Basophils 0.7 %    % Immature Granulocytes 0.0 %    Absolute Neutrophil 2.6 1.6 - 8.3 10e9/L    Absolute Lymphocytes 1.1 0.8 - 5.3 10e9/L    Absolute Monocytes 0.5 0.0 - 1.3 10e9/L    Absolute Eosinophils 0.1 0.0 - 0.7 10e9/L    Absolute Basophils 0.0 0.0 - 0.2 10e9/L    Abs Immature Granulocytes 0.0 0 - 0.4 10e9/L   Comprehensive metabolic panel   Result Value Ref Range    Sodium 140 133 - 144 mmol/L    Potassium 3.4 3.4 - 5.3 mmol/L    Chloride 107 94 - 109 mmol/L    Carbon Dioxide 25 20 - 32 mmol/L    Anion Gap 8 3 - 14 mmol/L    Glucose 79 70 - 99 mg/dL    Urea Nitrogen 12 7 - 30 mg/dL    Creatinine 0.62 (L) 0.66 - 1.25 mg/dL    GFR Estimate >90 >60 mL/min/1.7m2    GFR Estimate If Black >90 >60 mL/min/1.7m2    Calcium 8.2 (L) 8.5 - 10.1 mg/dL    Bilirubin Total 0.3 0.2 - 1.3 mg/dL    Albumin 3.2 (L) 3.4 - 5.0 g/dL    Protein Total 7.2 6.8 - 8.8 g/dL    Alkaline Phosphatase 89 40 -  150 U/L    ALT 21 0 - 70 U/L    AST 21 0 - 45 U/L   Lipase   Result Value Ref Range    Lipase 231 73 - 393 U/L   Blood gas venous   Result Value Ref Range    Ph Venous 7.39 7.32 - 7.43 pH    PCO2 Venous 44 40 - 50 mm Hg    PO2 Venous 37 25 - 47 mm Hg    Bicarbonate Venous 27 21 - 28 mmol/L    Base Excess Venous 1.3 mmol/L    FIO2 21    Nt probnp inpatient (BNP)   Result Value Ref Range    N-Terminal Pro BNP Inpatient 353 0 - 900 pg/mL   Lactic acid whole blood   Result Value Ref Range    Lactic Acid 0.9 0.7 - 2.0 mmol/L   CRP inflammation   Result Value Ref Range    CRP Inflammation 38.9 (H) 0.0 - 8.0 mg/L   Influenza A/B antigen   Result Value Ref Range    Influenza A/B Agn Specimen Nasopharyngeal     Influenza A Negative NEG^Negative    Influenza B Negative NEG^Negative   XR Chest 2 Views    Narrative    XR CHEST 2 VW 4/14/2018 7:46 PM    HISTORY: Pain.    COMPARISON: None.      Impression    IMPRESSION: The lungs are clear. No focal pulmonary opacities. Heart  and mediastinum are unremarkable. No acute cardiopulmonary  abnormalities.    NOE YAN MD   CT Abdomen Pelvis w Contrast    Narrative    CT ABDOMEN PELVIS W CONTRAST 4/14/2018 8:02 PM    HISTORY: Abdominal pain.    TECHNIQUE: CT of the abdomen and pelvis is performed with 71mL,  Isovue-370 intravenously. Radiation dose for this scan was reduced  using automated exposure control, adjustment of the mA and/or kV  according to patient size, or iterative reconstruction technique.    COMPARISON: None.    FINDINGS:    Liver: Normal.  Gallbladder:Normal.  Pancreas:Normal.  Spleen:Normal.  Adrenals:Normal.  Ascites:None.    Kidneys:Normal.  Bladder:Normal.  Pelvic free fluid:None.    Bowels:Unremarkable.  No evidence of obstruction.  Appendix:Normal.    Abdominal or pelvic lymphadenopathy:None.    Miscellaneous findings:None.      Impression    IMPRESSION:  Unremarkable CT scan of the abdomen and pelvis.    NOE YAN MD       Medications   HYDROmorphone  (PF) (DILAUDID) injection 0.5 mg (not administered)   ondansetron (ZOFRAN) injection 4 mg (4 mg Intravenous Given 4/14/18 1908)   Saline 100mL Bag (not administered)   sodium chloride (PF) 0.9% PF flush 3 mL (60 mLs Intracatheter Given 4/14/18 1954)   sodium chloride (PF) 0.9% PF flush 3 mL (not administered)   0.9% sodium chloride BOLUS (1,000 mLs Intravenous New Bag 4/14/18 1907)   sodium chloride (PF) 0.9% PF flush 3 mL (3 mLs Intravenous Given 4/14/18 1952)   iopamidol (ISOVUE-370) solution 100 mL (71 mLs Intravenous Given 4/14/18 1954)       Assessments & Plan (with Medical Decision Making)   MDM--48-year-old male who presents to the emergency department with fever chills weakness and abdominal pain. The patient states that he has had abdominal pain and intermittent bloody diarrhea for the past 8-10 years. He was actually scheduled for a colonoscopy recently however it was canceled. He has never had a colonoscopy or any workup of this. He has never had any stool studies cultures were checked for ova or parasites. The current patient's current febrile illness I suspect is a viral syndrome. He has a normal white count despite having symptoms all week. He has no focal or localized symptoms other than the abdominal cramping and diarrhea which apparently hasn't changed much over time and appears to be quite chronic. He has no sign of pneumonia on his chest x-ray. He is a smoker. His influenza test is negative. Abdominal CT was done because of his chronic abdominal pain and chronic diarrhea and she'll was no overt pathology. I recommended that he get a colonoscopy as his symptoms are very suspicious for ulcerative colitis or Crohn's or some other type of inflammatory colitis. In regards to his current illness he does not appear septic, his blood has been cultured, his urine has been cultured. We'll not start any antibiotics at this time as none indicated. The patient also mentioned that he had heart problems  diagnosed in 1991 and was told he would need a heart transplant in 4 years. He appears to be hemodynamically fine here. I don't appreciate any heart murmur. Heart size on chest x-ray is normal. He doesn't have any symptoms of chest pain or dyspnea on exertion. He has no evidence of edema or right-sided heart failure. In regards to his heart given that we have no records from 1991 and his age and his history of smoking I recommended he follow-up and get a stress test and a echocardiogram. I do not feel anything acutely is going on with his heart.    In summary..... He has a current febrile illness appears to be a viral syndrome--upper respiratory illness. We'll put him on steroids and inhaler. Encouraged to stop smoking. He has a long history of crampy bloody diarrhea and needs a colonoscopy for diagnostics. I also recommend he get a stool test for culture and parasites. He has some vague history of a heart problem but seems hemodynamically well at this time. A stress echo has been recommended. He should follow-up next week with his primary care physician to facilitate this. The patient is comfortable with this evaluation treatment and follow-up plan he is discharged in stable condition.      I have reviewed the nursing notes.    I have reviewed the findings, diagnosis, plan and need for follow up with the patient.          New Prescriptions    ALBUTEROL (PROAIR HFA/PROVENTIL HFA/VENTOLIN HFA) 108 (90 BASE) MCG/ACT INHALER    Inhale 2 puffs into the lungs every 6 hours for 10 days    PREDNISONE (DELTASONE) 20 MG TABLET    Take 1 tablet (20 mg) by mouth 2 times daily for 5 days       Final diagnoses:   Febrile illness, acute   Viral upper respiratory illness   Tobacco dependence syndrome   Chronic diarrhea   Bloody diarrhea     This document serves as a record of services personally performed by Sidra, Tyler NAVA MD. It was created on their behalf by Julio Carias, a trained medical scribe. The creation of this record  is based on the provider's personal observations and the statements of the patient. This document has been checked and approved by the attending provider.    Note: Chart documentation done in part with Dragon Voice Recognition software. Although reviewed after completion, some word and grammatical errors may remain.    4/14/2018   [facility] EMERGENCY DEPARTMENT     Sidra, Tyler NAVA MD  04/14/18 [id number]

## 2018-04-15 NOTE — DISCHARGE INSTRUCTIONS
I recommend you follow-up and complete a colonoscopy. I also recommend you have stool studies for culture and parasites. I also recommend you follow-up for a cardiac stress test and echocardiogram. Prednisone 20 mg twice a day for 5 days. Use your inhaler 2 puffs every 4 hours as needed for breathing and cough. Follow-up next week with your clinic doctor.

## 2018-04-18 ENCOUNTER — TELEPHONE (OUTPATIENT)
Dept: FAMILY MEDICINE | Facility: OTHER | Age: 48
End: 2018-04-18

## 2018-04-18 ENCOUNTER — OFFICE VISIT (OUTPATIENT)
Dept: FAMILY MEDICINE | Facility: OTHER | Age: 48
End: 2018-04-18
Payer: COMMERCIAL

## 2018-04-18 VITALS
BODY MASS INDEX: 23.53 KG/M2 | DIASTOLIC BLOOD PRESSURE: 80 MMHG | OXYGEN SATURATION: 100 % | RESPIRATION RATE: 20 BRPM | TEMPERATURE: 97.3 F | SYSTOLIC BLOOD PRESSURE: 124 MMHG | HEART RATE: 84 BPM | WEIGHT: 148 LBS

## 2018-04-18 DIAGNOSIS — G89.29 CHRONIC PAIN OF BOTH KNEES: ICD-10-CM

## 2018-04-18 DIAGNOSIS — M25.562 CHRONIC PAIN OF BOTH KNEES: ICD-10-CM

## 2018-04-18 DIAGNOSIS — M25.561 CHRONIC PAIN OF BOTH KNEES: ICD-10-CM

## 2018-04-18 DIAGNOSIS — R07.9 CHEST PAIN, UNSPECIFIED TYPE: ICD-10-CM

## 2018-04-18 DIAGNOSIS — R19.7 DIARRHEA, UNSPECIFIED TYPE: Primary | ICD-10-CM

## 2018-04-18 PROCEDURE — 99214 OFFICE O/P EST MOD 30 MIN: CPT | Performed by: NURSE PRACTITIONER

## 2018-04-18 NOTE — TELEPHONE ENCOUNTER
All appointments scheduled, prep instructions given.    Dori Turcios XRO/  [facility]

## 2018-04-18 NOTE — PROGRESS NOTES
SUBJECTIVE:   Misha Nicole is a 48 year old male who presents to clinic today for the following health issues:      ED/UC Followup:    Facility:  [facility]  Date of visit: 4/14/18  Reason for visit: Viral illness, abdominal pain  Current Status: better, needs referral for stress test     Excerpt from ED record:     Assessments & Plan (with Medical Decision Making)   MDM--48-year-old male who presents to the emergency department with fever chills weakness and abdominal pain. The patient states that he has had abdominal pain and intermittent bloody diarrhea for the past 8-10 years. He was actually scheduled for a colonoscopy recently however it was canceled. He has never had a colonoscopy or any workup of this. He has never had any stool studies cultures were checked for ova or parasites. The current patient's current febrile illness I suspect is a viral syndrome. He has a normal white count despite having symptoms all week. He has no focal or localized symptoms other than the abdominal cramping and diarrhea which apparently hasn't changed much over time and appears to be quite chronic. He has no sign of pneumonia on his chest x-ray. He is a smoker. His influenza test is negative. Abdominal CT was done because of his chronic abdominal pain and chronic diarrhea and she'll was no overt pathology. I recommended that he get a colonoscopy as his symptoms are very suspicious for ulcerative colitis or Crohn's or some other type of inflammatory colitis. In regards to his current illness he does not appear septic, his blood has been cultured, his urine has been cultured. We'll not start any antibiotics at this time as none indicated. The patient also mentioned that he had heart problems diagnosed in 1991 and was told he would need a heart transplant in 4 years. He appears to be hemodynamically fine here. I don't appreciate any heart murmur. Heart size on chest x-ray is normal. He doesn't have any symptoms of chest pain  or dyspnea on exertion. He has no evidence of edema or right-sided heart failure. In regards to his heart given that we have no records from 1991 and his age and his history of smoking I recommended he follow-up and get a stress test and a echocardiogram. I do not feel anything acutely is going on with his heart.     In summary..... He has a current febrile illness appears to be a viral syndrome--upper respiratory illness. We'll put him on steroids and inhaler. Encouraged to stop smoking. He has a long history of crampy bloody diarrhea and needs a colonoscopy for diagnostics. I also recommend he get a stool test for culture and parasites. He has some vague history of a heart problem but seems hemodynamically well at this time. A stress echo has been recommended. He should follow-up next week with his primary care physician to facilitate this. The patient is comfortable with this evaluation treatment and follow-up plan he is discharged in stable condition.    Since ED visit his symptoms are unchanged.  He previously had a colonoscopy and stress testing set up, but these were canceled.       Musculoskeletal problem/pain      Duration: chronic, bilateral knee pain     Description  Location: bilateral knee    Intensity:  moderate    Accompanying signs and symptoms: none    History  Previous similar problem: YES - has been told he needs knee replacement, previous right knee injury   Previous evaluation:  orthopedic evaluation    Precipitating or alleviating factors:  Trauma or overuse: YES  Aggravating factors include: walking and climbing stairs    Therapies tried and outcome: nothing       Problem list and histories reviewed & adjusted, as indicated.  Additional history: as documented    There is no problem list on file for this patient.    Past Surgical History:   Procedure Laterality Date     ORTHOPEDIC SURGERY  1985 &1996    Right & Left knee replacements       Social History   Substance Use Topics     Smoking  status: Current Every Day Smoker     Packs/day: 0.30     Types: Cigarettes     Smokeless tobacco: Current User     Types: Chew     Alcohol use Yes     No family history on file.      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    OBJECTIVE:     /80  Pulse 84  Temp 97.3  F (36.3  C) (Tympanic)  Resp 20  Wt 148 lb (67.1 kg)  SpO2 100%  BMI 23.53 kg/m2  Body mass index is 23.53 kg/(m^2).  GENERAL: healthy, alert and no distress  NECK: no adenopathy, no asymmetry, masses, or scars and thyroid normal to palpation  RESP: lungs clear to auscultation - no rales, rhonchi or wheezes  CV: regular rate and rhythm, normal S1 S2, no S3 or S4, no murmur, click or rub, no peripheral edema and peripheral pulses strong  ABDOMEN: soft, tenderness generalized and bowel sounds normal  MS: no gross musculoskeletal defects noted, no edema    Diagnostic Test Results:  none      ASSESSMENT/PLAN:         1. Diarrhea, unspecified type  Will get him set up for colonoscopy, but will need stress testing first as there is some question about his actual cardiac history.  Will send stool cultures, although I suspect inflammatory bowel disease as the cause of his symptoms.   - GASTROENTEROLOGY ADULT REF PROCEDURE ONLY [facility] [phone]; No Provider Preference  - Ova and Parasite Exam Routine; Future  - Clostridium difficile Toxin B PCR; Future  - Enteric Bacteria and Virus Panel by AIDEN Stool; Future    2. Chest pain, unspecified type  - NM Lexiscan stress test; Future    3. Chronic pain of both knees  - ORTHOPEDICS ADULT REFERRAL    FUTURE APPOINTMENTS:       - Follow up will be based on testing results.   See Patient Instructions    WILEY Waddell [facility]

## 2018-04-18 NOTE — LETTER

## 2018-04-18 NOTE — NURSING NOTE
"Chief Complaint   Patient presents with     Referral     needs stress test to have colonoscopy     ER F/U     ED: 4/14/18 viral illness       Initial /80  Pulse 84  Temp 97.3  F (36.3  C) (Tympanic)  Resp 20  Wt 148 lb (67.1 kg)  SpO2 100%  BMI 23.53 kg/m2 Estimated body mass index is 23.53 kg/(m^2) as calculated from the following:    Height as of 2/8/18: 5' 6.5\" (1.689 m).    Weight as of this encounter: 148 lb (67.1 kg).  Medication Reconciliation: complete   ................Abelino Armstrong LPN,   April 18, 2018,      10:06 AM,   [facility]    "

## 2018-04-18 NOTE — MR AVS SNAPSHOT
After Visit Summary   4/18/2018    Misha Nicole    MRN: [id number]           Patient Information     Date Of Birth          1970        Visit Information        Provider Department      4/18/2018 10:00 AM Gail Vargas APRN [facility]        Today's Diagnoses     Chronic pain of both knees    -  1    Diarrhea, unspecified type        Chest pain, unspecified type          Care Instructions    Bring back the stool samples.     Schedule the stress test and colonoscopy in [place].               Follow-ups after your visit        Additional Services     GASTROENTEROLOGY ADULT REF PROCEDURE ONLY [facility] [phone]; No Provider Preference       Last Lab Result: Creatinine (mg/dL)       Date                     Value                 04/14/2018               0.62 (L)         ----------  Body mass index is 23.53 kg/(m^2).     Needed:  No  Language:  English    Patient will be contacted to schedule procedure.     Please be aware that coverage of these services is subject to the terms and limitations of your health insurance plan.  Call member services at your health plan with any benefit or coverage questions.  Any procedures must be performed at a [facility] facility OR coordinated by your clinic's referral office.    Please bring the following with you to your appointment:    (1) Any X-Rays, CTs or MRIs which have been performed.  Contact the facility where they were done to arrange for  prior to your scheduled appointment.    (2) List of current medications   (3) This referral request   (4) Any documents/labs given to you for this referral            ORTHOPEDICS ADULT REFERRAL       Your provider has referred you to: [facility]: [facility] [phone]   [email or web address]    Please be aware that coverage of these services is subject to the terms and limitations of your health insurance plan.  Call  member services at your health plan with any benefit or coverage questions.      Please bring the following to your appointment:    >>   Any x-rays, CTs or MRIs which have been performed.  Contact the facility where they were done to arrange for  prior to your scheduled appointment.    >>   List of current medications   >>   This referral request   >>   Any documents/labs given to you for this referral                  Your next 10 appointments already scheduled     Apr 19, 2018  9:10 AM CDT   [facility] Jean Marie Dale,    [facility]    [address] [address]   [phone]            Apr 24, 2018  9:30 AM CDT   (Arrive by 9:00 AM)   NM MPI WITH LEXISCAN with PHNM1, NL STRESS TEST, PMC RM1   [facility] ([facility])    [address] [place] [id number]   [phone]           For a ONE day exam: Allow 3-4 hours for test. For a TWO day exam: Allow  minutes PER day for test.  On the day of your resting scan: Please stop eating 3 hours before the test. You may drink water or juice.  On the day of your stress test: Stop all caffeine 12 hours before the test. This includes coffee, tea, soda pop, chocolate and certain medicines (such as Anacin, Excedrin and NoDoz). Also avoid decaf coffee and tea, as these contain small amounts of caffeine.  Stop eating 3 hours before the test. You may drink water.  You may need to stop some medicines before the test. Follow your doctor s orders. - If you take a beta blocker: Do not take your beta-blocker on the day before your test, unless specifically told to by your doctor. And do not take it on the day of your test. Bring it with you to take after the test. - If you take Aggrenox or dipyridamole (Persantine, Permole), stop taking it 48 hours before your test. - If you take Viagra, Cialis or Levitra, stop taking it 48 hours before your  "test. - If you take theophylline or aminophylline, stop taking it 12 hours before your test.  Do not take nitrates on the day of your test. Do not wear your Nitro-Patch.  Please wear a loose two-piece outfit. If you will have an exercise test, bring rubber-soled walking shoes.  When you arrive, please tell us if you have a pacemaker or ICD (implantable defibrillator).  Please call your Imaging Department at your exam site with any questions.              Future tests that were ordered for you today     Open Future Orders        Priority Expected Expires Ordered    NM Lexiscan stress test Routine  4/18/2019 4/18/2018    Ova and Parasite Exam Routine Routine  4/18/2019 4/18/2018    Clostridium difficile Toxin B PCR Routine  5/18/2018 4/18/2018    Enteric Bacteria and Virus Panel by AIDEN Stool Routine  4/18/2019 4/18/2018            Who to contact     If you have questions or need follow up information about today's clinic visit or your schedule please contact [facility] directly at [phone].  Normal or non-critical lab and imaging results will be communicated to you by MedVentivehart, letter or phone within 4 business days after the clinic has received the results. If you do not hear from us within 7 days, please contact the clinic through [employer]ent[employer] or phone. If you have a critical or abnormal lab result, we will notify you by phone as soon as possible.  Submit refill requests through [employer] or call your pharmacy and they will forward the refill request to us. Please allow 3 business days for your refill to be completed.          Additional Information About Your Visit        Bus[employer] Information     [employer] lets you send messages to your doctor, view your test results, renew your prescriptions, schedule appointments and more. To sign up, go to [email or web address] . Click on \"Log in\" on the left side of the screen, which will take you to the Welcome page. Then click on \"Sign up Now\" on the right side of " the page.     You will be asked to enter the access code listed below, as well as some personal information. Please follow the directions to create your username and password.     Your access code is: [email or web address]  Expires: 2018  8:42 AM     Your access code will  in 90 days. If you need help or a new code, please call your [facility] clinic or [phone].        Care EveryWhere ID     This is your Care EveryWhere ID. This could be used by other organizations to access your [facility] medical records  [id number]        Your Vitals Were     Pulse Temperature Respirations Pulse Oximetry BMI (Body Mass Index)       84 97.3  F (36.3  C) (Tympanic) 20 100% 23.53 kg/m2        Blood Pressure from Last 3 Encounters:   18 124/80   18 118/79   18 124/77    Weight from Last 3 Encounters:   18 148 lb (67.1 kg)   18 145 lb (65.8 kg)   18 149 lb 3.2 oz (67.7 kg)              We Performed the Following     GASTROENTEROLOGY ADULT REF PROCEDURE ONLY [facility] [phone]; No Provider Preference     ORTHOPEDICS ADULT REFERRAL        Primary Care Provider Fax #    Physician No Ref-Primary [phone]       No address on file        Equal Access to Services     ALLISON GLEZ AH: Hadii kim colemano Soclarisaali, waaxda luqadaha, qaybta kaalmada adeegyaemanuel, chris mcintosh . So [facility] [phone].    ATENCIÓN: Si habla español, tiene a vo disposición servicios gratuitos de asistencia lingüística. Llame al [phone].    We comply with applicable federal civil rights laws and [place] laws. We do not discriminate on the basis of race, color, national origin, age, disability, sex, sexual orientation, or gender identity.            Thank you!     Thank you for choosing [facility]  for your care. Our goal is always to provide you with excellent care. Hearing back from our patients is one way we can continue to improve our services. Please take a few  minutes to complete the written survey that you may receive in the mail after your visit with us. Thank you!             Your Updated Medication List - Protect others around you: Learn how to safely use, store and throw away your medicines at www.disposemymeds.org.          This list is accurate as of 4/18/18 10:45 AM.  Always use your most recent med list.                   Brand Name Dispense Instructions for use Diagnosis    albuterol 108 (90 Base) MCG/ACT Inhaler    PROAIR HFA/PROVENTIL HFA/VENTOLIN HFA    1 Inhaler    Inhale 2 puffs into the lungs every 6 hours for 10 days        predniSONE 20 MG tablet    DELTASONE    10 tablet    Take 1 tablet (20 mg) by mouth 2 times daily for 5 days        triamcinolone 0.1 % cream    KENALOG    80 g    Apply sparingly to affected area three times daily as needed    Rash and nonspecific skin eruption

## 2018-04-18 NOTE — TELEPHONE ENCOUNTER
Notified patient that colonoscopy has been changed to 3pm instead of 1:30. He was OK with this change,

## 2018-04-19 ENCOUNTER — OFFICE VISIT (OUTPATIENT)
Dept: ORTHOPEDICS | Facility: CLINIC | Age: 48
End: 2018-04-19
Payer: COMMERCIAL

## 2018-04-19 ENCOUNTER — RADIANT APPOINTMENT (OUTPATIENT)
Dept: GENERAL RADIOLOGY | Facility: CLINIC | Age: 48
End: 2018-04-19
Attending: ORTHOPAEDIC SURGERY
Payer: COMMERCIAL

## 2018-04-19 VITALS
WEIGHT: 148 LBS | HEIGHT: 67 IN | SYSTOLIC BLOOD PRESSURE: 122 MMHG | TEMPERATURE: 98.9 F | DIASTOLIC BLOOD PRESSURE: 70 MMHG | BODY MASS INDEX: 23.23 KG/M2

## 2018-04-19 DIAGNOSIS — G89.29 CHRONIC PAIN OF BOTH KNEES: ICD-10-CM

## 2018-04-19 DIAGNOSIS — M25.561 CHRONIC PAIN OF BOTH KNEES: ICD-10-CM

## 2018-04-19 DIAGNOSIS — M17.0 BILATERAL PRIMARY OSTEOARTHRITIS OF KNEE: Primary | ICD-10-CM

## 2018-04-19 DIAGNOSIS — M25.561 BILATERAL KNEE PAIN: ICD-10-CM

## 2018-04-19 DIAGNOSIS — M25.562 BILATERAL KNEE PAIN: ICD-10-CM

## 2018-04-19 DIAGNOSIS — M25.562 CHRONIC PAIN OF BOTH KNEES: ICD-10-CM

## 2018-04-19 PROCEDURE — 20610 DRAIN/INJ JOINT/BURSA W/O US: CPT | Mod: 50 | Performed by: ORTHOPAEDIC SURGERY

## 2018-04-19 PROCEDURE — 99204 OFFICE O/P NEW MOD 45 MIN: CPT | Mod: 25 | Performed by: ORTHOPAEDIC SURGERY

## 2018-04-19 PROCEDURE — 73564 X-RAY EXAM KNEE 4 OR MORE: CPT | Mod: TC

## 2018-04-19 RX ORDER — TRIAMCINOLONE ACETONIDE 40 MG/ML
40 INJECTION, SUSPENSION INTRA-ARTICULAR; INTRAMUSCULAR ONCE
Qty: 2 ML | Refills: 0
Start: 2018-04-19 | End: 2018-04-19

## 2018-04-19 RX ORDER — METHOCARBAMOL 500 MG/1
500 TABLET, FILM COATED ORAL
Qty: 30 TABLET | Refills: 0 | Status: SHIPPED | OUTPATIENT
Start: 2018-04-19 | End: 2018-06-06

## 2018-04-19 ASSESSMENT — PAIN SCALES - GENERAL: PAINLEVEL: MODERATE PAIN (5)

## 2018-04-19 NOTE — LETTER
"    4/19/2018         RE: Misha Nicole  [address]  [place] [facility] [address]        Dear Colleague,    Thank you for referring your patient, Misha Nicole, to the [facility]. Please see a copy of my visit note below.    ORTHOPEDIC CONSULT      Chief Complaint: Misha Nicole is a 48 year old male who is being seen for Chief Complaint   Patient presents with     Knee Pain     bilateral knee pain     Consult     ref: Gail Spicer        History of Present Illness:   Misha Nicole is a 48 year old male who is seen in consultation at the request of Gail spicer  for evaluation of bilateral knee pain.  Mechanism of Injury: No trauma or inciting event initially but states he has had issues with knees since 80s after undergoing right knee surgery that he calls knee replacement but described as knee scope on the right and 90s on the left. States no major injury or trauma prior to all this. Has had pain in bilateral knees since then, pain is slowly progressive, worsening to the point of inhibiting sleep, activity, and decreasing quality of life.    Location: bilateral knee Right worse than left  Duration of Pain:  \"many years\"  Rating of Pain:  Moderate to severe pain.    Pain Quality: dull, aching and throbbing most of the time, occasional sharp stabbing pain with movements  Pain is better with: not much anymore. Rest, and activity avoidance. Takes \"lots of ibuprofen\" for pain.  Pain is worse with:  stairs, twisting, walking  Treatment so far consists of:  Ice, Ibuprofen.   Associated Features: Swelling, one or two episodes of giving out on left,. None on the right. Also describes muscle cramping/possible spasms.  No catching. No locking.  occasional tingling in the foot, but no numbness/tingling from the back down to the foot.   Denies previous PT or injections to the knees.     States he also has been dealing with a lot of stomach issues and pain. States is being worked up for this. Also notes he has been " "sick lately, and not doing well due to stomach. Also reports chest pain causing cancellation of colonoscopy recently.     Smokes. States has patches and cutting down.        Patient's past medical, surgical, social and family histories reviewed.     History reviewed. No pertinent past medical history.    Past Surgical History:   Procedure Laterality Date     ORTHOPEDIC SURGERY  1985 &1996    Right & Left knee replacements       Medications:    Current Outpatient Prescriptions on File Prior to Visit:  albuterol (PROAIR HFA/PROVENTIL HFA/VENTOLIN HFA) 108 (90 Base) MCG/ACT Inhaler Inhale 2 puffs into the lungs every 6 hours for 10 days   triamcinolone (KENALOG) 0.1 % cream Apply sparingly to affected area three times daily as needed     No current facility-administered medications on file prior to visit.     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     Social History Main Topics     Smoking status: Current Every Day Smoker     Packs/day: 0.30     Types: Cigarettes     Smokeless tobacco: Current User     Types: Chew     Alcohol use Yes     Drug use: No     Sexual activity: Not on file       History reviewed. No pertinent family history.    REVIEW OF SYSTEMS  10 point review systems performed otherwise negative as noted as per history of present illness.    Physical Exam:  Vitals: /70 (BP Location: Right arm, Patient Position: Chair, Cuff Size: Adult Regular)  Temp 98.9  F (37.2  C) (Temporal)  Ht 1.689 m (5' 6.5\")  Wt 67.1 kg (148 lb)  BMI 23.53 kg/m2  BMI= Body mass index is 23.53 kg/(m^2).  Constitutional: thin appearing.  alert and no acute distress   Psychiatric: mentation appears normal and affect normal/bright  NEURO: no focal deficits  RESP: Normal with easy respirations and no use of accessory muscles to breathe, no audible wheezing or " retractions  CV: bilateral lower extremity:   no edema         Regular rate and rhythm by palpation  SKIN: No erythema, rashes, excoriation, or breakdown. No evidence of infection.   JOINT/EXTREMITIES:right Knee Exam: Inspection: varum deformity. Small effusion   Tender: lateral joint line, medial joint line  No focal tenderness otherwise  Active Range of Motion: 5-90. PROM 3-110. Tightness and stiffness to quads, hamstrings.   Special tests: exquisite painful Gypsy. No instability. Pain with valgus stressing.  No pain with manipulation of patella  Distal neurovascular grossly intact.    Left Knee Exam: Inspection: varum deformity. Small effusion bilaterally  Tender: lateral joint line, medial joint line bilaterally. No focal tenderness otherwise  Active Range of Motion: 0-120  Special tests: negative Gypsy's. No instability. No pain with valgus/varus testing. No pain with manipulation of patella  Distal neurovascular grossly intact    Lymph: no appreciated lymphedema  GAIT: not tested    Diagnostic Modalities:  Right knee X-ray: multiple intra-articular ossified free bodies. Moderate medial compartment joint narrowing, mild lateral compartment joint narrowing. Moderate joint narrowing to patellofemoral compartment. Osteophytes present.     Left knee xray: no joint space narrowing to left. No fractures, soft tissue abnormalities.   Independent visualization of the images was performed.      Impression: right knee primary osteoarthritis  Right knee intra-articular free bodies  Left knee chronic pain - likely early osteoarthritis    Plan:  All of the above pertinent physical exam and imaging modalities findings was reviewed with Misha.  Discussed findings in knee and osteoarthritis diagnosis.  Discussed options, recommended conservative options especially with ongoing issues and work up of stomach problems.  Patient agreeable to PT and steroid injections. Patient also asked for something for pain at night to  help him sleep.  Given his current stomach issues recommended against him taking IBU and would not recommend an NSAID.  Narcotic would not appropriate. Did discuss a muscle relaxer to take at night especially with reports of cramping and muscle spasms.  Explained may make sleepy.     I recommend conservative care for the patient to include formal physical therapy, steroid injections. Today I provided or dispensed physical therapy and prescription of Robaxin.    The patient was counseled about an  injection, including discussion of risks (including infection), contents of the injection, rationale for performing the injection, and expected benefits of the injection. The skin was prepped with alcohol and betadine and then utilizing sterile technique an injection of the bilateral knee joint from the anterolateral approach in the seated position was performed. Each injection consisted 1ml of Kenalog (40mg per 1 ml) mixed with 3ml of 0.5% Marcaine. The patient tolerated the injection well, and there were no complications. The injection site was covered with a Band-Aid. The injection was performed by WILEY Nelson, CNP, MARI    Discussed red flag symptoms after knee injection including discussed s/s of infection and when to return to clinic.  Recommended to return to clinic if any discussed symptoms following knee injection (including increasing redness, swelling, pain, any drainage, unexplained fevers)    Return to clinic 4-6 , week(s) if needed, or sooner as needed for changes.  Re-x-ray on return: No    Scribed by:  WILEY Nelson, CNP, DNP  10:44 AM  4/19/2018    I attest I have seen and evaluated the patient.  I agree with above impression and plan.  Patrick Dale D.O.    Again, thank you for allowing me to participate in the care of your patient.        Sincerely,        Jean Marie Dale, DO

## 2018-04-19 NOTE — PROGRESS NOTES
"ORTHOPEDIC CONSULT      Chief Complaint: Misha Nicole is a 48 year old male who is being seen for Chief Complaint   Patient presents with     Knee Pain     bilateral knee pain     Consult     ref: Gail Spicer        History of Present Illness:   Misha Nicole is a 48 year old male who is seen in consultation at the request of Gail spicer  for evaluation of bilateral knee pain.  Mechanism of Injury: No trauma or inciting event initially but states he has had issues with knees since 80s after undergoing right knee surgery that he calls knee replacement but described as knee scope on the right and 90s on the left. States no major injury or trauma prior to all this. Has had pain in bilateral knees since then, pain is slowly progressive, worsening to the point of inhibiting sleep, activity, and decreasing quality of life.    Location: bilateral knee Right worse than left  Duration of Pain:  \"many years\"  Rating of Pain:  Moderate to severe pain.    Pain Quality: dull, aching and throbbing most of the time, occasional sharp stabbing pain with movements  Pain is better with: not much anymore. Rest, and activity avoidance. Takes \"lots of ibuprofen\" for pain.  Pain is worse with:  stairs, twisting, walking  Treatment so far consists of:  Ice, Ibuprofen.   Associated Features: Swelling, one or two episodes of giving out on left,. None on the right. Also describes muscle cramping/possible spasms.  No catching. No locking.  occasional tingling in the foot, but no numbness/tingling from the back down to the foot.   Denies previous PT or injections to the knees.     States he also has been dealing with a lot of stomach issues and pain. States is being worked up for this. Also notes he has been sick lately, and not doing well due to stomach. Also reports chest pain causing cancellation of colonoscopy recently.     Smokes. States has patches and cutting down.        Patient's past medical, surgical, social and family histories " "reviewed.     History reviewed. No pertinent past medical history.    Past Surgical History:   Procedure Laterality Date     ORTHOPEDIC SURGERY  1985 &1996    Right & Left knee replacements       Medications:    Current Outpatient Prescriptions on File Prior to Visit:  albuterol (PROAIR HFA/PROVENTIL HFA/VENTOLIN HFA) 108 (90 Base) MCG/ACT Inhaler Inhale 2 puffs into the lungs every 6 hours for 10 days   triamcinolone (KENALOG) 0.1 % cream Apply sparingly to affected area three times daily as needed     No current facility-administered medications on file prior to visit.     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     Social History Main Topics     Smoking status: Current Every Day Smoker     Packs/day: 0.30     Types: Cigarettes     Smokeless tobacco: Current User     Types: Chew     Alcohol use Yes     Drug use: No     Sexual activity: Not on file       History reviewed. No pertinent family history.    REVIEW OF SYSTEMS  10 point review systems performed otherwise negative as noted as per history of present illness.    Physical Exam:  Vitals: /70 (BP Location: Right arm, Patient Position: Chair, Cuff Size: Adult Regular)  Temp 98.9  F (37.2  C) (Temporal)  Ht 1.689 m (5' 6.5\")  Wt 67.1 kg (148 lb)  BMI 23.53 kg/m2  BMI= Body mass index is 23.53 kg/(m^2).  Constitutional: thin appearing.  alert and no acute distress   Psychiatric: mentation appears normal and affect normal/bright  NEURO: no focal deficits  RESP: Normal with easy respirations and no use of accessory muscles to breathe, no audible wheezing or retractions  CV: bilateral lower extremity:   no edema         Regular rate and rhythm by palpation  SKIN: No erythema, rashes, excoriation, or breakdown. No evidence of infection.   JOINT/EXTREMITIES:right Knee Exam: Inspection: varum " deformity. Small effusion   Tender: lateral joint line, medial joint line  No focal tenderness otherwise  Active Range of Motion: 5-90. PROM 3-110. Tightness and stiffness to quads, hamstrings.   Special tests: exquisite painful Gypsy. No instability. Pain with valgus stressing.  No pain with manipulation of patella  Distal neurovascular grossly intact.    Left Knee Exam: Inspection: varum deformity. Small effusion bilaterally  Tender: lateral joint line, medial joint line bilaterally. No focal tenderness otherwise  Active Range of Motion: 0-120  Special tests: negative Gypsy's. No instability. No pain with valgus/varus testing. No pain with manipulation of patella  Distal neurovascular grossly intact    Lymph: no appreciated lymphedema  GAIT: not tested    Diagnostic Modalities:  Right knee X-ray: multiple intra-articular ossified free bodies. Moderate medial compartment joint narrowing, mild lateral compartment joint narrowing. Moderate joint narrowing to patellofemoral compartment. Osteophytes present.     Left knee xray: no joint space narrowing to left. No fractures, soft tissue abnormalities.   Independent visualization of the images was performed.      Impression: right knee primary osteoarthritis  Right knee intra-articular free bodies  Left knee chronic pain - likely early osteoarthritis    Plan:  All of the above pertinent physical exam and imaging modalities findings was reviewed with Misha.  Discussed findings in knee and osteoarthritis diagnosis.  Discussed options, recommended conservative options especially with ongoing issues and work up of stomach problems.  Patient agreeable to PT and steroid injections. Patient also asked for something for pain at night to help him sleep.  Given his current stomach issues recommended against him taking IBU and would not recommend an NSAID.  Narcotic would not appropriate. Did discuss a muscle relaxer to take at night especially with reports of cramping and  muscle spasms.  Explained may make sleepy.     I recommend conservative care for the patient to include formal physical therapy, steroid injections. Today I provided or dispensed physical therapy and prescription of Robaxin.    The patient was counseled about an  injection, including discussion of risks (including infection), contents of the injection, rationale for performing the injection, and expected benefits of the injection. The skin was prepped with alcohol and betadine and then utilizing sterile technique an injection of the bilateral knee joint from the anterolateral approach in the seated position was performed. Each injection consisted 1ml of Kenalog (40mg per 1 ml) mixed with 3ml of 0.5% Marcaine. The patient tolerated the injection well, and there were no complications. The injection site was covered with a Band-Aid. The injection was performed by WILEY Nelson, CNP, MARI    Discussed red flag symptoms after knee injection including discussed s/s of infection and when to return to clinic.  Recommended to return to clinic if any discussed symptoms following knee injection (including increasing redness, swelling, pain, any drainage, unexplained fevers)    Return to clinic 4-6 , week(s) if needed, or sooner as needed for changes.  Re-x-ray on return: No    Scribed by:  WILEY Nelson, CNP, MARI  10:44 AM  4/19/2018    I attest I have seen and evaluated the patient.  I agree with above impression and plan.  Patrick Dale D.O.

## 2018-04-19 NOTE — MR AVS SNAPSHOT
After Visit Summary   4/19/2018    Misha Nicole    MRN: [id number]           Patient Information     Date Of Birth          1970        Visit Information        Provider Department      4/19/2018 9:10 AM Jean Marie Dale,  [facility]        Today's Diagnoses     Bilateral knee pain    -  1       Follow-ups after your visit        Your next 10 appointments already scheduled     Apr 24, 2018  9:30 AM CDT   (Arrive by 9:00 AM)   NM MPI WITH LEXISCAN with PHNM1, NL STRESS TEST, PMC RM1   [facility] Medicine ([facility])    [address] [place] [id number] [phone]           For a ONE day exam: Allow 3-4 hours for test. For a TWO day exam: Allow  minutes PER day for test.  On the day of your resting scan: Please stop eating 3 hours before the test. You may drink water or juice.  On the day of your stress test: Stop all caffeine 12 hours before the test. This includes coffee, tea, soda pop, chocolate and certain medicines (such as Anacin, Excedrin and NoDoz). Also avoid decaf coffee and tea, as these contain small amounts of caffeine.  Stop eating 3 hours before the test. You may drink water.  You may need to stop some medicines before the test. Follow your doctor s orders. - If you take a beta blocker: Do not take your beta-blocker on the day before your test, unless specifically told to by your doctor. And do not take it on the day of your test. Bring it with you to take after the test. - If you take Aggrenox or dipyridamole (Persantine, Permole), stop taking it 48 hours before your test. - If you take Viagra, Cialis or Levitra, stop taking it 48 hours before your test. - If you take theophylline or aminophylline, stop taking it 12 hours before your test.  Do not take nitrates on the day of your test. Do not wear your Nitro-Patch.  Please wear a loose two-piece outfit. If you will have an exercise test, bring  "rubber-soled walking shoes.  When you arrive, please tell us if you have a pacemaker or ICD (implantable defibrillator).  Please call your Imaging Department at your exam site with any questions.            May 07, 2018   Procedure with Nathaniel Cook DO   [facility] Endoscopy ([facility])    [address] [facility] [id number] [phone]              Future tests that were ordered for you today     Open Future Orders        Priority Expected Expires Ordered    NM Lexiscan stress test Routine  4/18/2019 4/18/2018    Ova and Parasite Exam Routine Routine  4/18/2019 4/18/2018    Clostridium difficile Toxin B PCR Routine  5/18/2018 4/18/2018    Enteric Bacteria and Virus Panel by AIDEN Stool Routine  4/18/2019 4/18/2018            Who to contact     If you have questions or need follow up information about today's clinic visit or your schedule please contact [facility] directly at [phone].  Normal or non-critical lab and imaging results will be communicated to you by [employer] Emporium[employer], letter or phone within 4 business days after the clinic has received the results. If you do not hear from us within 7 days, please contact the clinic through [employer] or phone. If you have a critical or abnormal lab result, we will notify you by phone as soon as possible.  Submit refill requests through [employer]ec[employer] or call your pharmacy and they will forward the refill request to us. Please allow 3 business days for your refill to be completed.          Additional Information About Your Visit        Cartoon [employer] Emporium[employer]CNS Response Information     Dogecoin lets you send messages to your doctor, view your test results, renew your prescriptions, schedule appointments and more. To sign up, go to [email or web address] . Click on \"Log in\" on the left side of the screen, which will take you to the Welcome page. Then click on \"Sign up Now\" on the right side of the page.     You will be asked to enter the access code " "listed below, as well as some personal information. Please follow the directions to create your username and password.     Your access code is: [email or web address]  Expires: 2018  8:42 AM     Your access code will  in 90 days. If you need help or a new code, please call your [facility] clinic or [phone].        Care EveryWhere ID     This is your Care EveryWhere ID. This could be used by other organizations to access your [facility] medical records  [id number]        Your Vitals Were     Temperature Height BMI (Body Mass Index)             98.9  F (37.2  C) (Temporal) 1.689 m (5' 6.5\") 23.53 kg/m2          Blood Pressure from Last 3 Encounters:   18 122/70   18 124/80   18 118/79    Weight from Last 3 Encounters:   18 67.1 kg (148 lb)   18 67.1 kg (148 lb)   18 65.8 kg (145 lb)               Primary Care Provider Fax #    Physician No Ref-Primary [phone]       No address on file        Equal Access to Services     [facility] : Hadii kim andrews hadandio Soeric, waaxda luqadaha, qaybta kaalmada adejannieyada, chris mcintosh . So [facility] [phone].    ATENCIÓN: Si habla español, tiene a vo disposición servicios gratuitos de asistencia lingüística. Llame al [phone].    We comply with applicable federal civil rights laws and [place] laws. We do not discriminate on the basis of race, color, national origin, age, disability, sex, sexual orientation, or gender identity.            Thank you!     Thank you for choosing [facility]  for your care. Our goal is always to provide you with excellent care. Hearing back from our patients is one way we can continue to improve our services. Please take a few minutes to complete the written survey that you may receive in the mail after your visit with us. Thank you!             Your Updated Medication List - Protect others around you: Learn how to safely use, store and throw away your medicines at " www.disposemymeds.org.          This list is accurate as of 4/19/18  9:27 AM.  Always use your most recent med list.                   Brand Name Dispense Instructions for use Diagnosis    albuterol 108 (90 Base) MCG/ACT Inhaler    PROAIR HFA/PROVENTIL HFA/VENTOLIN HFA    1 Inhaler    Inhale 2 puffs into the lungs every 6 hours for 10 days        triamcinolone 0.1 % cream    KENALOG    80 g    Apply sparingly to affected area three times daily as needed    Rash and nonspecific skin eruption

## 2018-04-19 NOTE — NURSING NOTE
"Chief Complaint   Patient presents with     Knee Pain     bilateral knee pain     Consult     ref: Gail Vargas        Initial /70 (BP Location: Right arm, Patient Position: Chair, Cuff Size: Adult Regular)  Temp 98.9  F (37.2  C) (Temporal)  Ht 1.689 m (5' 6.5\")  Wt 67.1 kg (148 lb)  BMI 23.53 kg/m2 Estimated body mass index is 23.53 kg/(m^2) as calculated from the following:    Height as of this encounter: 1.689 m (5' 6.5\").    Weight as of this encounter: 67.1 kg (148 lb).  Medication Reconciliation: complete   Jose/FELICIANO     "

## 2018-04-20 LAB
BACTERIA SPEC CULT: NORMAL
BACTERIA SPEC CULT: NORMAL
Lab: NORMAL
Lab: NORMAL
SPECIMEN SOURCE: NORMAL
SPECIMEN SOURCE: NORMAL

## 2018-04-22 PROCEDURE — 87209 SMEAR COMPLEX STAIN: CPT | Performed by: NURSE PRACTITIONER

## 2018-04-22 PROCEDURE — 87506 IADNA-DNA/RNA PROBE TQ 6-11: CPT | Performed by: NURSE PRACTITIONER

## 2018-04-22 PROCEDURE — 87177 OVA AND PARASITES SMEARS: CPT | Performed by: NURSE PRACTITIONER

## 2018-04-23 DIAGNOSIS — R19.7 DIARRHEA, UNSPECIFIED TYPE: ICD-10-CM

## 2018-04-23 LAB
C COLI+JEJUNI+LARI FUSA STL QL NAA+PROBE: NOT DETECTED
C DIFF TOX B STL QL: ABNORMAL
EC STX1 GENE STL QL NAA+PROBE: NOT DETECTED
EC STX2 GENE STL QL NAA+PROBE: NOT DETECTED
ENTERIC PATHOGEN COMMENT: NORMAL
NOROV GI+II ORF1-ORF2 JNC STL QL NAA+PR: NOT DETECTED
RVA NSP5 STL QL NAA+PROBE: NOT DETECTED
SALMONELLA SP RPOD STL QL NAA+PROBE: NOT DETECTED
SHIGELLA SP+EIEC IPAH STL QL NAA+PROBE: NOT DETECTED
SPECIMEN SOURCE: ABNORMAL
V CHOL+PARA RFBL+TRKH+TNAA STL QL NAA+PR: NOT DETECTED
Y ENTERO RECN STL QL NAA+PROBE: NOT DETECTED

## 2018-04-23 PROCEDURE — 87209 SMEAR COMPLEX STAIN: CPT | Performed by: NURSE PRACTITIONER

## 2018-04-23 PROCEDURE — 87177 OVA AND PARASITES SMEARS: CPT | Performed by: NURSE PRACTITIONER

## 2018-04-24 ENCOUNTER — HOSPITAL ENCOUNTER (OUTPATIENT)
Dept: NUCLEAR MEDICINE | Facility: CLINIC | Age: 48
Setting detail: NUCLEAR MEDICINE
Discharge: HOME | End: 2018-04-24
Attending: NURSE PRACTITIONER | Admitting: NURSE PRACTITIONER
Payer: COMMERCIAL

## 2018-04-24 DIAGNOSIS — R07.9 CHEST PAIN, UNSPECIFIED TYPE: ICD-10-CM

## 2018-04-24 LAB
O+P STL MICRO: NORMAL
SPECIMEN SOURCE: NORMAL
SPECIMEN SOURCE: NORMAL

## 2018-04-24 PROCEDURE — 93018 CV STRESS TEST I&R ONLY: CPT | Performed by: INTERNAL MEDICINE

## 2018-04-24 PROCEDURE — A9502 TC99M TETROFOSMIN: HCPCS | Performed by: NURSE PRACTITIONER

## 2018-04-24 PROCEDURE — 93017 CV STRESS TEST TRACING ONLY: CPT | Performed by: REHABILITATION PRACTITIONER

## 2018-04-24 PROCEDURE — 78452 HT MUSCLE IMAGE SPECT MULT: CPT

## 2018-04-24 PROCEDURE — 34300033 ZZH RX 343: Performed by: NURSE PRACTITIONER

## 2018-04-24 PROCEDURE — 93016 CV STRESS TEST SUPVJ ONLY: CPT | Performed by: INTERNAL MEDICINE

## 2018-04-24 PROCEDURE — 78452 HT MUSCLE IMAGE SPECT MULT: CPT | Mod: 26 | Performed by: INTERNAL MEDICINE

## 2018-04-24 RX ADMIN — TETROFOSMIN 10.7 MCI.: 1.38 INJECTION, POWDER, LYOPHILIZED, FOR SOLUTION INTRAVENOUS at 09:30

## 2018-04-24 RX ADMIN — TETROFOSMIN 33 MCI.: 1.38 INJECTION, POWDER, LYOPHILIZED, FOR SOLUTION INTRAVENOUS at 11:30

## 2018-04-24 NOTE — LETTER
April 27, 2018      Madisyn Nicole  [address] [facility] [address]        Dear ,    We are writing to inform you of your test results.    Your stress test showed one abnormal area of your heart.  They could not be sure exactly what it was, but think it is likely nothing to worry about.  It may be an area of previous damage, but there does not appear to be any current heart damage or concerns.  If you are not having any symptoms (chest pain, shortness of breath, leg swelling), then you don't have to do anything else.  If you are having any symptoms, I would have you see the cardiologist.  Let me know if you want to do that and I would put in a referral.     Please let me know if you have any additional questions.     Electronically signed by Gail Vargas CNP.    Resulted Orders   NM Lexiscan stress test    Narrative    GATED MYOCARDIAL PERFUSION SCINTIGRAPHY EXERCISE- ONE DAY STUDY     4/24/2018 12:26 PM  MADISYN NICOLE  48 years  Male  1970.    Indication/Clinical History: Chest pain    Impression  1.  Myocardial perfusion imaging using single isotope technique  demonstrated a moderate size relatively fixed inferior/inferoseptal  defect extending from the base to the apex, rest greater than stress,  most likely consistent with diaphragm attenuation/bowel uptake  artifact; cannot exclude small nontransmural infarct with minimal or  no mary alice-infarct ischemia. No other significant ischemia or infarct  noted..   2. Gated images demonstrated mildly dilated left ventricle with  moderate decrease in overall contractility with global hypokinesis and  no significant regional wall motion abnormality.  The left ventricular  systolic function is moderately decreased with ejection fraction 39%.  3. Compared to the prior study from not applicable .    Procedure  The patient performed treadmill exercise using a Jarocho protocol,  completing 11 minutes and 0 seconds with an estimated workload of 13.0  METS.  The test  was terminated due to dyspnea and fatigue. The heart  rate was 65 beats per minute at baseline and increased to 153 beats at  peak exercise, which was 90% of the maximum predicted heart rate. The  rest blood pressure was 114/62 mm/Hg and peak blood pressure is  150/62mm/Hg with rate pressure product of 22,950. The patient  experienced dyspnea and fatigue during the test. The patient was not  on a beta blocker.    Myocardial perfusion imaging was performed at rest, approximately 45  minutes after the injection intravenously of 10.7of Tc-99m Myoview. At  peak exercise, the patient was injected intravenously with 33.0 mCi of   Tc-99m Myoview and exercise continued for approximately 1 minute.  Gated post-stress tomographic imaging was performed approximately 30  minutes after stress    EKG Findings  The resting EKG demonstrated normal sinus rhythm with incomplete right  bundle branch block and left anterior fascicular block with mild  diffuse nonspecific ST-T wave changes . The stress EKG demonstrated  sinus tachycardia with incomplete right bundle branch block, left  anterior fascicular block, isolated ventricular ectopy and no  significant ST depression.    Tomographic Findings  Overall, the study quality is adequate . On the stress images, there  is a small to moderate size mild inferior/inferoseptal defect  extending from the base the apex. On the rest images, there is a small  to moderate size mild inferior/inferoseptal defect extending from the  base the apex . Gated images demonstrated mildly dilated left  ventricle with moderate decrease in overall contractility with global  hypokinesis and no significant regional wall motion abnormality. The  left ventricular ejection fraction was calculated to be 39% with  stress 47% rest. TID was absent.    ARNULFO SOLARES MD       If you have any questions or concerns, please call the clinic at the number listed above.       Sincerely,    Stress TestPrinceTest

## 2018-04-27 NOTE — PROGRESS NOTES
Letter sent to patient informing of paulaan.  ................Abelino Armstrong LPN,   April 27, 2018,      2:49 PM,   [facility]

## 2018-05-06 ENCOUNTER — ANESTHESIA EVENT (OUTPATIENT)
Dept: GASTROENTEROLOGY | Facility: CLINIC | Age: 48
End: 2018-05-06
Payer: COMMERCIAL

## 2018-05-07 ENCOUNTER — HOSPITAL ENCOUNTER (OUTPATIENT)
Facility: CLINIC | Age: 48
Discharge: HOME | End: 2018-05-07
Attending: SURGERY | Admitting: SURGERY
Payer: COMMERCIAL

## 2018-05-07 ENCOUNTER — ANESTHESIA (OUTPATIENT)
Dept: GASTROENTEROLOGY | Facility: CLINIC | Age: 48
End: 2018-05-07
Payer: COMMERCIAL

## 2018-05-07 ENCOUNTER — SURGERY (OUTPATIENT)
Age: 48
End: 2018-05-07

## 2018-05-07 VITALS
SYSTOLIC BLOOD PRESSURE: 117 MMHG | DIASTOLIC BLOOD PRESSURE: 78 MMHG | HEART RATE: 88 BPM | OXYGEN SATURATION: 99 % | TEMPERATURE: 98.3 F | RESPIRATION RATE: 16 BRPM

## 2018-05-07 LAB — COLONOSCOPY: NORMAL

## 2018-05-07 PROCEDURE — 25000128 H RX IP 250 OP 636: Performed by: SURGERY

## 2018-05-07 PROCEDURE — 25000125 ZZHC RX 250: Performed by: NURSE ANESTHETIST, CERTIFIED REGISTERED

## 2018-05-07 PROCEDURE — 45380 COLONOSCOPY AND BIOPSY: CPT | Performed by: SURGERY

## 2018-05-07 PROCEDURE — 40000296 ZZH STATISTIC ENDO RECOVERY CLASS 1:2 FIRST HOUR: Performed by: SURGERY

## 2018-05-07 PROCEDURE — 25000125 ZZHC RX 250: Performed by: SURGERY

## 2018-05-07 PROCEDURE — 88305 TISSUE EXAM BY PATHOLOGIST: CPT | Performed by: SURGERY

## 2018-05-07 PROCEDURE — 88305 TISSUE EXAM BY PATHOLOGIST: CPT | Mod: 26 | Performed by: SURGERY

## 2018-05-07 PROCEDURE — 25000128 H RX IP 250 OP 636: Performed by: NURSE ANESTHETIST, CERTIFIED REGISTERED

## 2018-05-07 RX ORDER — NALOXONE HYDROCHLORIDE 0.4 MG/ML
.1-.4 INJECTION, SOLUTION INTRAMUSCULAR; INTRAVENOUS; SUBCUTANEOUS
Status: DISCONTINUED | OUTPATIENT
Start: 2018-05-07 | End: 2018-05-07 | Stop reason: ALTCHOICE

## 2018-05-07 RX ORDER — PROPOFOL 10 MG/ML
INJECTION, EMULSION INTRAVENOUS CONTINUOUS PRN
Status: DISCONTINUED | OUTPATIENT
Start: 2018-05-07 | End: 2018-05-07

## 2018-05-07 RX ORDER — METOPROLOL TARTRATE 1 MG/ML
1-2 INJECTION, SOLUTION INTRAVENOUS EVERY 5 MIN PRN
Status: DISCONTINUED | OUTPATIENT
Start: 2018-05-07 | End: 2018-05-07 | Stop reason: HOSPADM

## 2018-05-07 RX ORDER — ONDANSETRON 2 MG/ML
4 INJECTION INTRAMUSCULAR; INTRAVENOUS EVERY 6 HOURS PRN
Status: DISCONTINUED | OUTPATIENT
Start: 2018-05-07 | End: 2018-05-07 | Stop reason: HOSPADM

## 2018-05-07 RX ORDER — SODIUM CHLORIDE, SODIUM LACTATE, POTASSIUM CHLORIDE, CALCIUM CHLORIDE 600; 310; 30; 20 MG/100ML; MG/100ML; MG/100ML; MG/100ML
INJECTION, SOLUTION INTRAVENOUS CONTINUOUS
Status: DISCONTINUED | OUTPATIENT
Start: 2018-05-07 | End: 2018-05-07 | Stop reason: HOSPADM

## 2018-05-07 RX ORDER — LIDOCAINE 40 MG/G
CREAM TOPICAL
Status: DISCONTINUED | OUTPATIENT
Start: 2018-05-07 | End: 2018-05-07 | Stop reason: HOSPADM

## 2018-05-07 RX ORDER — LIDOCAINE HYDROCHLORIDE 20 MG/ML
INJECTION, SOLUTION INFILTRATION; PERINEURAL PRN
Status: DISCONTINUED | OUTPATIENT
Start: 2018-05-07 | End: 2018-05-07

## 2018-05-07 RX ORDER — FLUMAZENIL 0.1 MG/ML
0.2 INJECTION, SOLUTION INTRAVENOUS
Status: DISCONTINUED | OUTPATIENT
Start: 2018-05-07 | End: 2018-05-07 | Stop reason: HOSPADM

## 2018-05-07 RX ORDER — ONDANSETRON 2 MG/ML
4 INJECTION INTRAMUSCULAR; INTRAVENOUS
Status: DISCONTINUED | OUTPATIENT
Start: 2018-05-07 | End: 2018-05-07 | Stop reason: HOSPADM

## 2018-05-07 RX ORDER — ONDANSETRON 4 MG/1
4 TABLET, ORALLY DISINTEGRATING ORAL EVERY 30 MIN PRN
Status: DISCONTINUED | OUTPATIENT
Start: 2018-05-07 | End: 2018-05-07 | Stop reason: ALTCHOICE

## 2018-05-07 RX ORDER — FENTANYL CITRATE 50 UG/ML
25-50 INJECTION, SOLUTION INTRAMUSCULAR; INTRAVENOUS
Status: DISCONTINUED | OUTPATIENT
Start: 2018-05-07 | End: 2018-05-07 | Stop reason: HOSPADM

## 2018-05-07 RX ORDER — MEPERIDINE HYDROCHLORIDE 25 MG/ML
12.5 INJECTION INTRAMUSCULAR; INTRAVENOUS; SUBCUTANEOUS
Status: DISCONTINUED | OUTPATIENT
Start: 2018-05-07 | End: 2018-05-07 | Stop reason: HOSPADM

## 2018-05-07 RX ORDER — PROPOFOL 10 MG/ML
INJECTION, EMULSION INTRAVENOUS PRN
Status: DISCONTINUED | OUTPATIENT
Start: 2018-05-07 | End: 2018-05-07

## 2018-05-07 RX ORDER — ONDANSETRON 2 MG/ML
4 INJECTION INTRAMUSCULAR; INTRAVENOUS EVERY 30 MIN PRN
Status: DISCONTINUED | OUTPATIENT
Start: 2018-05-07 | End: 2018-05-07 | Stop reason: ALTCHOICE

## 2018-05-07 RX ORDER — ONDANSETRON 4 MG/1
4 TABLET, ORALLY DISINTEGRATING ORAL EVERY 6 HOURS PRN
Status: DISCONTINUED | OUTPATIENT
Start: 2018-05-07 | End: 2018-05-07 | Stop reason: HOSPADM

## 2018-05-07 RX ORDER — NALOXONE HYDROCHLORIDE 0.4 MG/ML
.1-.4 INJECTION, SOLUTION INTRAMUSCULAR; INTRAVENOUS; SUBCUTANEOUS
Status: DISCONTINUED | OUTPATIENT
Start: 2018-05-07 | End: 2018-05-07 | Stop reason: HOSPADM

## 2018-05-07 RX ADMIN — PROPOFOL 50 MG: 10 INJECTION, EMULSION INTRAVENOUS at 17:21

## 2018-05-07 RX ADMIN — PROPOFOL 50 MG: 10 INJECTION, EMULSION INTRAVENOUS at 17:19

## 2018-05-07 RX ADMIN — SODIUM CHLORIDE, POTASSIUM CHLORIDE, SODIUM LACTATE AND CALCIUM CHLORIDE: 600; 310; 30; 20 INJECTION, SOLUTION INTRAVENOUS at 17:06

## 2018-05-07 RX ADMIN — PROPOFOL 175 MCG/KG/MIN: 10 INJECTION, EMULSION INTRAVENOUS at 17:19

## 2018-05-07 RX ADMIN — PROPOFOL 30 MG: 10 INJECTION, EMULSION INTRAVENOUS at 17:30

## 2018-05-07 RX ADMIN — LIDOCAINE HYDROCHLORIDE 40 MG: 20 INJECTION, SOLUTION INFILTRATION; PERINEURAL at 17:19

## 2018-05-07 RX ADMIN — SODIUM CHLORIDE, POTASSIUM CHLORIDE, SODIUM LACTATE AND CALCIUM CHLORIDE: 600; 310; 30; 20 INJECTION, SOLUTION INTRAVENOUS at 14:44

## 2018-05-07 RX ADMIN — PROPOFOL 50 MG: 10 INJECTION, EMULSION INTRAVENOUS at 17:23

## 2018-05-07 RX ADMIN — LIDOCAINE HYDROCHLORIDE 1 ML: 10 INJECTION, SOLUTION EPIDURAL; INFILTRATION; INTRACAUDAL; PERINEURAL at 14:44

## 2018-05-07 RX ADMIN — PROPOFOL 50 MG: 10 INJECTION, EMULSION INTRAVENOUS at 17:25

## 2018-05-07 ASSESSMENT — LIFESTYLE VARIABLES: TOBACCO_USE: 1

## 2018-05-07 NOTE — OR NURSING
Verbal report received from Leigh TOVAR RN and Daquan SHAW. GI Phase 2 recovery assumed by Zarina MACE. Pt post-op MAC for colonoscopy per .

## 2018-05-07 NOTE — INTERVAL H&P NOTE
Misha did get a cardiac stress test. There was a possible small infarct versus diaphragm or intestinal uptake. He does have a not very specific history of occasional chest pain but he does not take any nitro or otherwise for relief. He denies ever having a clinically significant heart attack. His left ventricle EF was 39%. He was seen by his PCP but not a cardiologist yet. He denies chest pain with exertion. He does have some shortness of breath with strenuous exercise. I feel as though Misha can safely undergo diagnostic colonoscopy with moderate sedation, but I do also recommend he see a cardiologist for possibly further cardiac testing. He is cleared for this procedure with propofol/MAC sedation.

## 2018-05-07 NOTE — DISCHARGE INSTRUCTIONS
[facility]    Home Care Following Endoscopy    Dr. Cook      Activity:    You have just undergone an endoscopic procedure usually performed with conscious sedation.  Do not work or operate machinery (including a car) for at least 12 hours.      I encourage you to walk and attempt to pass this air as soon as possible.    Diet:    Return to the diet you were on before your procedure but eat lightly for the first 12-24 hours.    Drink plenty of water.    Resume any regular medications unless otherwise advised by your physician.       You had biopsies done so please refrain from aspirin or aspirin products for 2 days.     Pain:    You may take Tylenol as needed for pain.  Expected Recovery:  You can expect some mild abdominal fullness and/or discomfort due to the air used to inflate your intestinal tract.      Call Your Physician if You Have:     After Colonoscopy:  o Worsening persisting abdominal pain which is worse with activity.  o Fevers (>101 degrees F), chills or shakes.  o Passage of continued blood with bowel movements.   Any questions or concerns about your recovery, please call [phone] or after hours [phone] Nurse Advice Line.    Follow-up Care:    You should receive a call or letter with your results within 1 week. Please call if you have not received a notification of your results.

## 2018-05-07 NOTE — IP AVS SNAPSHOT
[facility]    [address] [facility] [address]    Phone:  [phone]                                       After Visit Summary   5/7/2018    Misha Nicole    MRN: [id number]           After Visit Summary Signature Page     I have received my discharge instructions, and my questions have been answered. I have discussed any challenges I see with this plan with the nurse or doctor.    ..........................................................................................................................................  Patient/Patient Representative Signature      ..........................................................................................................................................  Patient Representative Print Name and Relationship to Patient    ..................................................               ................................................  Date                                            Time    ..........................................................................................................................................  Reviewed by Signature/Title    ...................................................              ..............................................  Date                                                            Time

## 2018-05-07 NOTE — IP AVS SNAPSHOT
MRN:[id number]                      After Visit Summary   5/7/2018    Misha Nicole    MRN: [id number]           Thank you!     Thank you for choosing [facility] for your care. Our goal is always to provide you with excellent care. Hearing back from our patients is one way we can continue to improve our services. Please take a few minutes to complete the written survey that you may receive in the mail after you visit with us. Thank you!        Patient Information     Date Of Birth          1970        About your hospital stay     You were admitted on:  May 7, 2018 You last received care in the:  [facility]    You were discharged on:  May 7, 2018       Who to Call     For medical emergencies, please call 911.  For non-urgent questions about your medical care, please call your primary care provider or clinic, None  For questions related to your surgery, please call your surgery clinic        Attending Provider     Provider Nathaniel Lewis, DO Surgery       Primary Care Provider Fax #    Physician No Ref-Primary [phone]      Further instructions from your care team         [facility]    Home Care Following Endoscopy    Dr. Cook      Activity:    You have just undergone an endoscopic procedure usually performed with conscious sedation.  Do not work or operate machinery (including a car) for at least 12 hours.      I encourage you to walk and attempt to pass this air as soon as possible.    Diet:    Return to the diet you were on before your procedure but eat lightly for the first 12-24 hours.    Drink plenty of water.    Resume any regular medications unless otherwise advised by your physician.       You had biopsies done so please refrain from aspirin or aspirin products for 2 days.     Pain:    You may take Tylenol as needed for pain.  Expected Recovery:  You can expect some mild abdominal fullness and/or discomfort due to the air  "used to inflate your intestinal tract.      Call Your Physician if You Have:     After Colonoscopy:  o Worsening persisting abdominal pain which is worse with activity.  o Fevers (>101 degrees F), chills or shakes.  o Passage of continued blood with bowel movements.   Any questions or concerns about your recovery, please call [phone] or after hours [phone] Nurse Advice Line.    Follow-up Care:    You should receive a call or letter with your results within 1 week. Please call if you have not received a notification of your results.       Pending Results     Date and Time Order Name Status Description    2018 1744 Surgical pathology exam In process             Admission Information     Date & Time Provider Department Dept. Phone    2018 Nathaniel Cook,  [facility] [phone]      Your Vitals Were     Blood Pressure Pulse Temperature Respirations Pulse Oximetry       120/80 88 98.3  F (36.8  C) (Oral) 16 100%       MyChart Information     [facility] lets you send messages to your doctor, view your test results, renew your prescriptions, schedule appointments and more. To sign up, go to [email or web address] . Click on \"Log in\" on the left side of the screen, which will take you to the Welcome page. Then click on \"Sign up Now\" on the right side of the page.     You will be asked to enter the access code listed below, as well as some personal information. Please follow the directions to create your username and password.     Your access code is: [email or web address]  Expires: 2018  8:42 AM     Your access code will  in 90 days. If you need help or a new code, please call your [place] clinic or [phone].        Care EveryWhere ID     This is your Care EveryWhere ID. This could be used by other organizations to access your [place] medical records  [id number]        Equal Access to Services     ALLISON LEDESMA: ruba Wright, stevan " chris bellthang gudinoaan ah. [facility] [phone].    ATENCIÓN: Si brian hyatt, tiene a vo disposición servicios gratuitos de asistencia lingüística. Danae al [phone].    We comply with applicable federal civil rights laws and [place] laws. We do not discriminate on the basis of race, color, national origin, age, disability, sex, sexual orientation, or gender identity.               Review of your medicines      CONTINUE these medicines which have NOT CHANGED        Dose / Directions    albuterol 108 (90 Base) MCG/ACT Inhaler   Commonly known as:  PROAIR HFA/PROVENTIL HFA/VENTOLIN HFA        Dose:  2 puff   Inhale 2 puffs into the lungs every 6 hours for 10 days   Quantity:  1 Inhaler   Refills:  0       methocarbamol 500 MG tablet   Commonly known as:  ROBAXIN   Used for:  Chronic pain of both knees, Bilateral primary osteoarthritis of knee        Dose:  500 mg   Take 1 tablet (500 mg) by mouth nightly as needed for muscle spasms Do not mix with alcohol. Can make you sleepy   Quantity:  30 tablet   Refills:  0       triamcinolone 0.1 % cream   Commonly known as:  KENALOG   Used for:  Rash and nonspecific skin eruption        Apply sparingly to affected area three times daily as needed   Quantity:  80 g   Refills:  0                Protect others around you: Learn how to safely use, store and throw away your medicines at www.disposemymeds.org.             Medication List: This is a list of all your medications and when to take them. Check marks below indicate your daily home schedule. Keep this list as a reference.      Medications           Morning Afternoon Evening Bedtime As Needed    albuterol 108 (90 Base) MCG/ACT Inhaler   Commonly known as:  PROAIR HFA/PROVENTIL HFA/VENTOLIN HFA   Inhale 2 puffs into the lungs every 6 hours for 10 days                                methocarbamol 500 MG tablet   Commonly known as:  ROBAXIN   Take 1 tablet (500 mg) by mouth nightly as  needed for muscle spasms Do not mix with alcohol. Can make you sleepy                                triamcinolone 0.1 % cream   Commonly known as:  KENALOG   Apply sparingly to affected area three times daily as needed

## 2018-05-07 NOTE — ANESTHESIA PREPROCEDURE EVALUATION
"  Anesthesia Evaluation     . Pt has not had prior anesthetic            ROS/MED HX    ENT/Pulmonary:     (+)tobacco use, Current use Intermittent asthma Treatment: Inhaler prn,  , . .   (-) other ENT disease   Neurologic:  - neg neurologic ROS     Cardiovascular: Comment: Patient has history of chest pain and SOB, most recently 2 weeks ago.  He is not seeing a cardiologist presently.  He states he was told he needed cardiac stents and that he had the heart of \"an old man\".  There are no cardiology notes or records in his chart at this time.   5/7/18- pt did have a stress echo done 4/2018, see in note below- EF 39%.    Did discuss pts history with Dr. Cook and we both agree we can proceed with case    (+) --CAD, -past MI,-. : . . . :. valvular problems/murmurs . Previous cardiac testing Echodate:4/14/18results:date:4/14/18 results:GATED MYOCARDIAL PERFUSION SCINTIGRAPHY EXERCISE- ONE DAY STUDY      4/24/2018 12:26 PM MADISYN WOODS 48 years Male  1970.     Indication/Clinical History: Chest pain     Impression  1.  Myocardial perfusion imaging using single isotope technique  demonstrated a moderate size relatively fixed inferior/inferoseptal  defect extending from the base to the apex, rest greater than stress,  most likely consistent with diaphragm attenuation/bowel uptake  artifact; cannot exclude small nontransmural infarct with minimal or  no mary alice-infarct ischemia. No other significant ischemia or infarct  noted..   2. Gated images demonstrated mildly dilated left ventricle with  moderate decrease in overall contractility with global hypokinesis and  no significant regional wall motion abnormality.  The left ventricular  systolic function is moderately decreased with ejection fraction 39%.  3. Compared to the prior study from not applicable .     Procedure  The patient performed treadmill exercise using a Jarocho protocol,  completing 11 minutes and 0 seconds with an estimated workload of 13.0  METS.  " The test was terminated due to dyspnea and fatigue. The heart  rate was 65 beats per minute at baseline and increased to 153 beats at  peak exercise, which was 90% of the maximum predicted heart rate. The  rest blood pressure was 114/62 mm/Hg and peak blood pressure is  150/62mm/Hg with rate pressure product of 22,950. The patient  experienced dyspnea and fatigue during the test. The patient was not  on a beta blocker.     Myocardial perfusion imaging was performed at rest, approximately 45  minutes after the injection intravenously of 10.7of Tc-99m Myoview. At  peak exercise, the patient was injected intravenously with 33.0 mCi of   Tc-99m Myoview and exercise continued for approximately 1 minute.  Gated post-stress tomographic imaging was performed approximately 30  minutes after stress     EKG Findings  The resting EKG demonstrated normal sinus rhythm with incomplete right  bundle branch block and left anterior fascicular block with mild  diffuse nonspecific ST-T wave changes . The stress EKG demonstrated  sinus tachycardia with incomplete right bundle branch block, left  anterior fascicular block, isolated ventricular ectopy and no  significant ST depression.     Tomographic Findings  Overall, the study quality is adequate . On the stress images, there  is a small to moderate size mild inferior/inferoseptal defect  extending from the base the apex. On the rest images, there is a small  to moderate size mild inferior/inferoseptal defect extending from the  base the apex . Gated images demonstrated mildly dilated left  ventricle with moderate decrease in overall contractility with global  hypokinesis and no significant regional wall motion abnormality. The  left ventricular ejection fraction was calculated to be 39% with  stress 47% rest. TID was absent.     HARSHAL VILLEDA reviewed date:4/14/18 results:SR occ. PVCs, left ant fascicular blk, nonspecific T abn date: results:          METS/Exercise Tolerance:   >4 METS   Hematologic:  - neg hematologic  ROS       Musculoskeletal: Comment: Bilateral TKA  (+) arthritis, , , other musculoskeletal- marta knee pain      GI/Hepatic:  - neg GI/hepatic ROS       Renal/Genitourinary:  - ROS Renal section negative   (+) Pt has no history of transplant,       Endo: Comment: Patient states he has recently lost over 45 pounds and is not trying to loose weight.  - neg endo ROS       Psychiatric:  - neg psychiatric ROS       Infectious Disease:  - neg infectious disease ROS       Malignancy:      - no malignancy   Other:    (+) No chance of pregnancy C-spine cleared: N/A, H/O Chronic Pain,                   Physical Exam  Normal systems: cardiovascular and pulmonary    Airway   Mallampati: II  TM distance: >3 FB  Neck ROM: full    Dental   (+) chipped and loose    Cardiovascular   Rhythm and rate: regular and normal      Pulmonary    breath sounds clear to auscultation                    Anesthesia Plan      History & Physical Review  History and physical reviewed and following examination; no interval change.    ASA Status:  2 .    NPO Status:  > 6 hours    Plan for MAC with Propofol induction. Maintenance will be Balanced.  Reason for MAC:  Deep or markedly invasive procedure (G8)         Postoperative Care      Consents  Anesthetic plan, risks, benefits and alternatives discussed with:  Patient.  Use of blood products discussed: No .   .                          .

## 2018-05-07 NOTE — H&P (VIEW-ONLY)
SUBJECTIVE:   Misha Nicole is a 48 year old male who presents to clinic today for the following health issues:      ED/UC Followup:    Facility:  [facility]  Date of visit: 4/14/18  Reason for visit: Viral illness, abdominal pain  Current Status: better, needs referral for stress test     Excerpt from ED record:     Assessments & Plan (with Medical Decision Making)   MDM--48-year-old male who presents to the emergency department with fever chills weakness and abdominal pain. The patient states that he has had abdominal pain and intermittent bloody diarrhea for the past 8-10 years. He was actually scheduled for a colonoscopy recently however it was canceled. He has never had a colonoscopy or any workup of this. He has never had any stool studies cultures were checked for ova or parasites. The current patient's current febrile illness I suspect is a viral syndrome. He has a normal white count despite having symptoms all week. He has no focal or localized symptoms other than the abdominal cramping and diarrhea which apparently hasn't changed much over time and appears to be quite chronic. He has no sign of pneumonia on his chest x-ray. He is a smoker. His influenza test is negative. Abdominal CT was done because of his chronic abdominal pain and chronic diarrhea and she'll was no overt pathology. I recommended that he get a colonoscopy as his symptoms are very suspicious for ulcerative colitis or Crohn's or some other type of inflammatory colitis. In regards to his current illness he does not appear septic, his blood has been cultured, his urine has been cultured. We'll not start any antibiotics at this time as none indicated. The patient also mentioned that he had heart problems diagnosed in 1991 and was told he would need a heart transplant in 4 years. He appears to be hemodynamically fine here. I don't appreciate any heart murmur. Heart size on chest x-ray is normal. He doesn't have any symptoms of chest pain  or dyspnea on exertion. He has no evidence of edema or right-sided heart failure. In regards to his heart given that we have no records from 1991 and his age and his history of smoking I recommended he follow-up and get a stress test and a echocardiogram. I do not feel anything acutely is going on with his heart.     In summary..... He has a current febrile illness appears to be a viral syndrome--upper respiratory illness. We'll put him on steroids and inhaler. Encouraged to stop smoking. He has a long history of crampy bloody diarrhea and needs a colonoscopy for diagnostics. I also recommend he get a stool test for culture and parasites. He has some vague history of a heart problem but seems hemodynamically well at this time. A stress echo has been recommended. He should follow-up next week with his primary care physician to facilitate this. The patient is comfortable with this evaluation treatment and follow-up plan he is discharged in stable condition.    Since ED visit his symptoms are unchanged.  He previously had a colonoscopy and stress testing set up, but these were canceled.       Musculoskeletal problem/pain      Duration: chronic, bilateral knee pain     Description  Location: bilateral knee    Intensity:  moderate    Accompanying signs and symptoms: none    History  Previous similar problem: YES - has been told he needs knee replacement, previous right knee injury   Previous evaluation:  orthopedic evaluation    Precipitating or alleviating factors:  Trauma or overuse: YES  Aggravating factors include: walking and climbing stairs    Therapies tried and outcome: nothing       Problem list and histories reviewed & adjusted, as indicated.  Additional history: as documented    There is no problem list on file for this patient.    Past Surgical History:   Procedure Laterality Date     ORTHOPEDIC SURGERY  1985 &1996    Right & Left knee replacements       Social History   Substance Use Topics     Smoking  status: Current Every Day Smoker     Packs/day: 0.30     Types: Cigarettes     Smokeless tobacco: Current User     Types: Chew     Alcohol use Yes     No family history on file.      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    OBJECTIVE:     /80  Pulse 84  Temp 97.3  F (36.3  C) (Tympanic)  Resp 20  Wt 148 lb (67.1 kg)  SpO2 100%  BMI 23.53 kg/m2  Body mass index is 23.53 kg/(m^2).  GENERAL: healthy, alert and no distress  NECK: no adenopathy, no asymmetry, masses, or scars and thyroid normal to palpation  RESP: lungs clear to auscultation - no rales, rhonchi or wheezes  CV: regular rate and rhythm, normal S1 S2, no S3 or S4, no murmur, click or rub, no peripheral edema and peripheral pulses strong  ABDOMEN: soft, tenderness generalized and bowel sounds normal  MS: no gross musculoskeletal defects noted, no edema    Diagnostic Test Results:  none      ASSESSMENT/PLAN:         1. Diarrhea, unspecified type  Will get him set up for colonoscopy, but will need stress testing first as there is some question about his actual cardiac history.  Will send stool cultures, although I suspect inflammatory bowel disease as the cause of his symptoms.   - GASTROENTEROLOGY ADULT REF PROCEDURE ONLY [facility] [phone]; No Provider Preference  - Ova and Parasite Exam Routine; Future  - Clostridium difficile Toxin B PCR; Future  - Enteric Bacteria and Virus Panel by [facility] Stool; Future    2. Chest pain, unspecified type  - NM Lexiscan stress test; Future    3. Chronic pain of both knees  - ORTHOPEDICS ADULT REFERRAL    FUTURE APPOINTMENTS:       - Follow up will be based on testing results.   See Patient Instructions    WILEY Waddell [facility]3]

## 2018-05-07 NOTE — ANESTHESIA POSTPROCEDURE EVALUATION
Patient: Misha Nicole    Procedure(s):  Colonoscopy with biopsy by biopsy forceps - Wound Class: II-Clean Contaminated    Diagnosis:Diarrhea  Diagnosis Additional Information: No value filed.    Anesthesia Type:  MAC    Note:  Anesthesia Post Evaluation    Patient location during evaluation: Phase 2 and Bedside  Patient participation: Able to fully participate in evaluation  Level of consciousness: awake  Pain management: adequate  Airway patency: patent  Cardiovascular status: acceptable and hemodynamically stable  Respiratory status: acceptable, room air and nonlabored ventilation  Hydration status: stable  PONV: none     Anesthetic complications: None    Comments: Patient was happy with the anesthesia care received and no anesthesia related complications were noted.  I will follow up with the patient again if it is needed.        Last vitals:  Vitals:    05/07/18 1400 05/07/18 1752 05/07/18 1800   BP: 103/78 108/81 120/80   Pulse: 88     Resp: 20 12 16   Temp: 98.3  F (36.8  C)     SpO2: 99% 100%          Electronically Signed By: WILEY Hadley CRNA  May 7, 2018  6:05 PM

## 2018-05-07 NOTE — ANESTHESIA CARE TRANSFER NOTE
Patient: Misha Nicole    Procedure(s):  Colonoscopy with biopsy by biopsy forceps - Wound Class: II-Clean Contaminated    Diagnosis: Diarrhea  Diagnosis Additional Information: No value filed.    Anesthesia Type:   MAC     Note:  Airway :Face Mask  Patient transferred to:Phase II  Handoff Report: Identifed the Patient, Identified the Reponsible Provider, Reviewed the pertinent medical history, Discussed the surgical course, Reviewed Intra-OP anesthesia mangement and issues during anesthesia, Set expectations for post-procedure period and Allowed opportunity for questions and acknowledgement of understanding      Vitals: (Last set prior to Anesthesia Care Transfer)    CRNA VITALS  5/7/2018 1722 - 5/7/2018 1758      5/7/2018             Pulse: 71    SpO2: 100 %    Resp Rate (observed): 15                Electronically Signed By: WILEY Hadley CRNA  May 7, 2018  5:58 PM

## 2018-05-09 LAB — COPATH REPORT: NORMAL

## 2018-05-10 ENCOUNTER — TELEPHONE (OUTPATIENT)
Dept: FAMILY MEDICINE | Facility: OTHER | Age: 48
End: 2018-05-10

## 2018-05-10 DIAGNOSIS — R93.3 ABNORMAL COLONOSCOPY: Primary | ICD-10-CM

## 2018-05-10 NOTE — TELEPHONE ENCOUNTER
----- Message from WILEY Waddell CNP sent at 5/10/2018  8:37 AM CDT -----  Please call and advise patient his colonoscopy showed chronic inflammation.  Biopsy showed atrophy and chronic inflammatory changes.  I am not sure the cause of this.  He will need to see a gastroenterologist.  Referral has been placed, please assist with scheduling.     Electronically signed by Gail Vargas CNP.

## 2018-05-10 NOTE — TELEPHONE ENCOUNTER
Called patient, referral sent to [facility], patient given number to call if he does not hear from there office in 5-7 business days, information faxed to [facility].    Dori Turcios XRO/  [facility]

## 2018-06-05 ENCOUNTER — TELEPHONE (OUTPATIENT)
Dept: FAMILY MEDICINE | Facility: OTHER | Age: 48
End: 2018-06-05

## 2018-06-05 NOTE — TELEPHONE ENCOUNTER
Reason for Call:  Same Day Appointment, Requested Provider:  Gail Vargas CNP    PCP: Gail Vargas    Reason for visit: ongoing stomach pain.  Can not see specialist for a few weeks yet.    Duration of symptoms: ongoing    Have you been treated for this in the past? Yes    Additional comments: asking if Gail Vargas will see him tomorrow 6/6. He is having a hard time coping with the discomfort.     Can we leave a detailed message on this number? YES    Phone number patient can be reached at: Home number on file [phone] (home)    Best Time: anytime    Call taken on 6/5/2018 at 2:21 PM by Rachelle Méndez

## 2018-06-06 ENCOUNTER — OFFICE VISIT (OUTPATIENT)
Dept: FAMILY MEDICINE | Facility: OTHER | Age: 48
End: 2018-06-06
Payer: COMMERCIAL

## 2018-06-06 VITALS
WEIGHT: 135 LBS | HEIGHT: 66 IN | RESPIRATION RATE: 16 BRPM | DIASTOLIC BLOOD PRESSURE: 70 MMHG | BODY MASS INDEX: 21.69 KG/M2 | TEMPERATURE: 99.1 F | SYSTOLIC BLOOD PRESSURE: 122 MMHG | HEART RATE: 88 BPM | OXYGEN SATURATION: 99 %

## 2018-06-06 DIAGNOSIS — R10.13 ABDOMINAL PAIN, EPIGASTRIC: Primary | ICD-10-CM

## 2018-06-06 DIAGNOSIS — K62.89 RECTAL PAIN: ICD-10-CM

## 2018-06-06 PROCEDURE — 99214 OFFICE O/P EST MOD 30 MIN: CPT | Performed by: NURSE PRACTITIONER

## 2018-06-06 RX ORDER — ALBUTEROL SULFATE 90 UG/1
2 AEROSOL, METERED RESPIRATORY (INHALATION) EVERY 6 HOURS
COMMUNITY

## 2018-06-06 RX ORDER — PREDNISONE 20 MG/1
40 TABLET ORAL DAILY
Qty: 10 TABLET | Refills: 0 | Status: SHIPPED | OUTPATIENT
Start: 2018-06-06 | End: 2018-06-11

## 2018-06-06 ASSESSMENT — ANXIETY QUESTIONNAIRES
3. WORRYING TOO MUCH ABOUT DIFFERENT THINGS: NOT AT ALL
2. NOT BEING ABLE TO STOP OR CONTROL WORRYING: NEARLY EVERY DAY
1. FEELING NERVOUS, ANXIOUS, OR ON EDGE: NOT AT ALL
IF YOU CHECKED OFF ANY PROBLEMS ON THIS QUESTIONNAIRE, HOW DIFFICULT HAVE THESE PROBLEMS MADE IT FOR YOU TO DO YOUR WORK, TAKE CARE OF THINGS AT HOME, OR GET ALONG WITH OTHER PEOPLE: NOT DIFFICULT AT ALL
5. BEING SO RESTLESS THAT IT IS HARD TO SIT STILL: NOT AT ALL
GAD7 TOTAL SCORE: 3
7. FEELING AFRAID AS IF SOMETHING AWFUL MIGHT HAPPEN: NOT AT ALL
6. BECOMING EASILY ANNOYED OR IRRITABLE: NOT AT ALL

## 2018-06-06 ASSESSMENT — PAIN SCALES - GENERAL: PAINLEVEL: MODERATE PAIN (5)

## 2018-06-06 ASSESSMENT — PATIENT HEALTH QUESTIONNAIRE - PHQ9: 5. POOR APPETITE OR OVEREATING: NOT AT ALL

## 2018-06-06 NOTE — PATIENT INSTRUCTIONS
Take the Omeprazole once a day prior to eating.      Start Prednisone once a day in the morning with food.     See the Gastroenterologist on 6/12/18 as planned.       Possible Crohn s Disease    Crohn s disease is inflammation of the intestinal tract that comes and goes in flare-ups. This is a chronic (long-term) illness. During a flare-up, intense abdominal pain and fever may be felt. Mucus, blood, or pus may appear in the stool. Between flare-ups, inflammation lessens and there usually are no symptoms. Crohn s disease is a form of inflammatory bowel disease (IBD).   Symptoms of Crohn's disease may include:    Abdominal cramps and pain    Diarrhea, sometimes bloody, occasionally alternating with constipation    Mucus in stools    Rectal bleeding    Rectal pain    Fever    Low energy    Decreased appetite and weight loss    Bloating or swollen abdomen    Nausea or vomiting    Joint aches    Sores in mouth    Red, painful eye problems    Rashes in anal area or other anal problems  No one knows what exactly causes Crohn's disease, and there is no cure. The goal of treatment is to control and relieve symptoms and prevent complications, so you can lead a full and active life. No single treatment works for everyone, but many things can be done to help.  Diet  Foods did not cause your Crohn's disease, but they can affect it. Unfortunately, there is no one diet that works for everyone. Keep a food log to figure out what you are sensitive to. Below are some things to try.     Eat more slowly and smaller amounts at a time, but more often. Remember, you can always eat more if you are still hungry, but you can t eat less once you ve eaten too much.    High fiber foods are complicated. While they may help constipation they can make the bloating, cramping, gas, and diarrhea worse.    If your Crohn's disease involves the small intestine, avoiding lactose-containing dairy products can help relieve some symptoms.    Try cutting out  "fatty foods and meats.    Eat less sugar.    Bloating or passing excess gas may be controlled. Stay away from \"gassy\" foods, such as beans, cabbage, broccoli, and cauliflower.    Stay away from beverages and fruit juices. They can make the bloating and diarrhea worse.    Caffeine, alcohol, and stimulants may worsen symptoms. These include coffee, tea, sodas, energy drinks, and chocolate.  Lifestyle  Although stress does not cause Crohn's, it is often a factor in flare-ups. It can also affect how you feel about and cope with your health problem.    Look for things that seem to worsen your symptoms, such as stress and emotions.    Counseling can often help relieve stress. So can self-help measures such as exercise, yoga, and meditation.    Depression can be a part of this illness and antidepressants may be prescribed. This may actually help with diarrhea, constipation, and cramping, as well as depression.    Smoking doesn't cause Crohn's, but can make the symptoms worse.  Medicines  Your healthcare provider may prescribe medicines. If so, take them as directed. In Crohn's disease, long-term medicine is usually needed. This is because control of the disease is very important to prevent complications down the line. For acute flare-ups, prescription medicines can be prescribed. Call your healthcare provider if you need this.    Ask your healthcare provider before taking any antidiarrheal medicines.    Don t take anti-inflammatory medicines like ibuprofen or naproxen.    Consider nutritional supplements. This is especially true if the diarrhea is prolonged, or you aren't eating or are losing weight.   If you need supplements, talk it over with your healthcare provider for the best recommendation. Stay away from herbal supplements as they are not evaluated by the FDA. Because of this, there is no way to be certain of their safety and purity.  Follow-up care  Follow up with your healthcare provider, or as advised. If a " stool sample was taken or cultures were done, you will be told if your treatment needs to change. You may also need blood tests, procedures, or imaging studies. Call as directed for the results.  Support  Joining a support group for people with Crohn s disease can be a source of useful information on how others are coping with this illness. They are available in person, on the phone, or on the Web. Contact the following resources for more information.    Crohn s and Colitis [employer] 814.743.9230 www.ccfa.org    National Digestive Diseases Information Clearinghouse (NDDIC) www.digestive.niddk.nih.gov  When to get medical advice  Call your healthcare provider right away if any of these occur:    Fever of 100.4 F (38 C) or higher, or as directed by your healthcare provider    Abdominal pain that doesn't get better when you take the usual measures    Mucus, pus, or blood in the stool (dark or bright red)    Repeated vomiting    Abdominal swelling and pain that doesn t go away after a few hours  Call 911  Call 911 if any of these occur:    Trouble breathing    Confusion    Extreme sleepiness or trouble waking up    Fainting or loss of consciousness    Rapid heart rate  Date Last Reviewed: 11/1/2017 2000-2017 [employer]. [address] [place] [address], [place] [address]. All rights reserved. This information is not intended as a substitute for professional medical care. Always follow your healthcare professional's instructions.

## 2018-06-06 NOTE — TELEPHONE ENCOUNTER
Called and spoke to the patient. He said he has been dealing with the stomach pain off and on for years. He said the last year has been the worst and he really just would like some answers. He said he did have a colonoscopy. Patient said this is not an acute issue but he was hoping to come in and talk to Gail to see if there is something he can take or do to start helping the symptoms he is having.     Gail had a cancellation today at 3:20. Patient is scheduled per his request.     Janey Vieyra RN  [facility]

## 2018-06-06 NOTE — MR AVS SNAPSHOT
After Visit Summary   6/6/2018    Misha Nicole    MRN: [id number]           Patient Information     Date Of Birth          1970        Visit Information        Provider Department      6/6/2018 3:20 PM Gail Vargas APRN [facility]        Today's Diagnoses     Abdominal pain, epigastric    -  1    Rectal pain          Care Instructions    Take the Omeprazole once a day prior to eating.      Start Prednisone once a day in the morning with food.     See the Gastroenterologist on 6/12/18 as planned.       Possible Crohn s Disease    Crohn s disease is inflammation of the intestinal tract that comes and goes in flare-ups. This is a chronic (long-term) illness. During a flare-up, intense abdominal pain and fever may be felt. Mucus, blood, or pus may appear in the stool. Between flare-ups, inflammation lessens and there usually are no symptoms. Crohn s disease is a form of inflammatory bowel disease (IBD).   Symptoms of Crohn's disease may include:    Abdominal cramps and pain    Diarrhea, sometimes bloody, occasionally alternating with constipation    Mucus in stools    Rectal bleeding    Rectal pain    Fever    Low energy    Decreased appetite and weight loss    Bloating or swollen abdomen    Nausea or vomiting    Joint aches    Sores in mouth    Red, painful eye problems    Rashes in anal area or other anal problems  No one knows what exactly causes Crohn's disease, and there is no cure. The goal of treatment is to control and relieve symptoms and prevent complications, so you can lead a full and active life. No single treatment works for everyone, but many things can be done to help.  Diet  Foods did not cause your Crohn's disease, but they can affect it. Unfortunately, there is no one diet that works for everyone. Keep a food log to figure out what you are sensitive to. Below are some things to try.     Eat more slowly and smaller amounts at a time, but more often. Remember,  "you can always eat more if you are still hungry, but you can t eat less once you ve eaten too much.    High fiber foods are complicated. While they may help constipation they can make the bloating, cramping, gas, and diarrhea worse.    If your Crohn's disease involves the small intestine, avoiding lactose-containing dairy products can help relieve some symptoms.    Try cutting out fatty foods and meats.    Eat less sugar.    Bloating or passing excess gas may be controlled. Stay away from \"gassy\" foods, such as beans, cabbage, broccoli, and cauliflower.    Stay away from beverages and fruit juices. They can make the bloating and diarrhea worse.    Caffeine, alcohol, and stimulants may worsen symptoms. These include coffee, tea, sodas, energy drinks, and chocolate.  Lifestyle  Although stress does not cause Crohn's, it is often a factor in flare-ups. It can also affect how you feel about and cope with your health problem.    Look for things that seem to worsen your symptoms, such as stress and emotions.    Counseling can often help relieve stress. So can self-help measures such as exercise, yoga, and meditation.    Depression can be a part of this illness and antidepressants may be prescribed. This may actually help with diarrhea, constipation, and cramping, as well as depression.    Smoking doesn't cause Crohn's, but can make the symptoms worse.  Medicines  Your healthcare provider may prescribe medicines. If so, take them as directed. In Crohn's disease, long-term medicine is usually needed. This is because control of the disease is very important to prevent complications down the line. For acute flare-ups, prescription medicines can be prescribed. Call your healthcare provider if you need this.    Ask your healthcare provider before taking any antidiarrheal medicines.    Don t take anti-inflammatory medicines like ibuprofen or naproxen.    Consider nutritional supplements. This is especially true if the diarrhea " is prolonged, or you aren't eating or are losing weight.   If you need supplements, talk it over with your healthcare provider for the best recommendation. Stay away from herbal supplements as they are not evaluated by the FDA. Because of this, there is no way to be certain of their safety and purity.  Follow-up care  Follow up with your healthcare provider, or as advised. If a stool sample was taken or cultures were done, you will be told if your treatment needs to change. You may also need blood tests, procedures, or imaging studies. Call as directed for the results.  Support  Joining a support group for people with Crohn s disease can be a source of useful information on how others are coping with this illness. They are available in person, on the phone, or on the Web. Contact the following resources for more information.    Crohn s and Colitis Foundation [employer] 886.491.9570 www.ccfa.org    National Digestive Diseases Information Clearinghouse (NDDIC) www.digestive.niddk.nih.gov  When to get medical advice  Call your healthcare provider right away if any of these occur:    Fever of 100.4 F (38 C) or higher, or as directed by your healthcare provider    Abdominal pain that doesn't get better when you take the usual measures    Mucus, pus, or blood in the stool (dark or bright red)    Repeated vomiting    Abdominal swelling and pain that doesn t go away after a few hours  Call 911  Call 911 if any of these occur:    Trouble breathing    Confusion    Extreme sleepiness or trouble waking up    Fainting or loss of consciousness    Rapid heart rate  Date Last Reviewed: 11/1/2017 2000-2017 [employer]. [address] [place] [address], [place] [address]. All rights reserved. This information is not intended as a substitute for professional medical care. Always follow your healthcare professional's instructions.                    Follow-ups after your visit        Who to contact     If you have  "questions or need follow up information about today's clinic visit or your schedule please contact [facility] directly at [phone].  Normal or non-critical lab and imaging results will be communicated to you by MyChart, letter or phone within 4 business days after the clinic has received the results. If you do not hear from us within 7 days, please contact the clinic through MyChart or phone. If you have a critical or abnormal lab result, we will notify you by phone as soon as possible.  Submit refill requests through [employer] or call your pharmacy and they will forward the refill request to us. Please allow 3 business days for your refill to be completed.          Additional Information About Your Visit        Care EveryWhere ID     This is your Care EveryWhere ID. This could be used by other organizations to access your [facility] medical records  [id number]        Your Vitals Were     Pulse Temperature Respirations Height Pulse Oximetry BMI (Body Mass Index)    88 99.1  F (37.3  C) (Tympanic) 16 5' 6.25\" (1.683 m) 99% 21.63 kg/m2       Blood Pressure from Last 3 Encounters:   06/06/18 122/70   05/07/18 117/78   04/19/18 122/70    Weight from Last 3 Encounters:   06/06/18 135 lb (61.2 kg)   04/19/18 148 lb (67.1 kg)   04/18/18 148 lb (67.1 kg)              Today, you had the following     No orders found for display         Today's Medication Changes          These changes are accurate as of 6/6/18  4:34 PM.  If you have any questions, ask your nurse or doctor.               Start taking these medicines.        Dose/Directions    omeprazole 20 MG CR capsule   Commonly known as:  priLOSEC   Used for:  Abdominal pain, epigastric   Started by:  Gail Vargas APRN CNP        Dose:  20 mg   Take 1 capsule (20 mg) by mouth daily   Quantity:  30 capsule   Refills:  1       predniSONE 20 MG tablet   Commonly known as:  DELTASONE   Used for:  Rectal pain, Abdominal pain, epigastric   Started by:  " Gail Vargas, WILEY CNP        Dose:  40 mg   Take 2 tablets (40 mg) by mouth daily for 5 days   Quantity:  10 tablet   Refills:  0            Where to get your medicines      These medications were sent to [facility] - [place], [place] - [address]  [address] , [facility] [address]     Phone:  [phone]     omeprazole 20 MG CR capsule    predniSONE 20 MG tablet                Primary Care Provider Office Phone # Fax #    WILEY Waddell [phone]       [address] [facility] [address]        Equal Access to Services     [facility] : Hadii aad ku hadasho Soomaali, waaxda luqadaha, qaybta kaalmada adeegyada, chris tang hayaaebonie mcintosh . So [facility] [phone].    ATENCIÓN: Si habla español, tiene a vo disposición servicios gratuitos de asistencia lingüística. Llame al [phone].    We comply with applicable federal civil rights laws and [place] laws. We do not discriminate on the basis of race, color, national origin, age, disability, sex, sexual orientation, or gender identity.            Thank you!     Thank you for choosing [facility]  for your care. Our goal is always to provide you with excellent care. Hearing back from our patients is one way we can continue to improve our services. Please take a few minutes to complete the written survey that you may receive in the mail after your visit with us. Thank you!             Your Updated Medication List - Protect others around you: Learn how to safely use, store and throw away your medicines at www.disposemymeds.org.          This list is accurate as of 6/6/18  4:34 PM.  Always use your most recent med list.                   Brand Name Dispense Instructions for use Diagnosis    omeprazole 20 MG CR capsule    priLOSEC    30 capsule    Take 1 capsule (20 mg) by mouth daily    Abdominal pain, epigastric       predniSONE 20 MG tablet    DELTASONE    10 tablet    Take 2 tablets (40 mg) by mouth  daily for 5 days    Rectal pain, Abdominal pain, epigastric       PROAIR  (90 Base) MCG/ACT Inhaler   Generic drug:  albuterol      Inhale 2 puffs into the lungs every 6 hours As needed        triamcinolone 0.1 % cream    KENALOG    80 g    Apply sparingly to affected area three times daily as needed    Rash and nonspecific skin eruption

## 2018-06-06 NOTE — TELEPHONE ENCOUNTER
Please triage this.  I am not able to see him on 6/6/18.     Electronically signed by Gail Vargas CNP.

## 2018-06-06 NOTE — PROGRESS NOTES
SUBJECTIVE:   Misha Nicole is a 48 year old male who presents to clinic today for the following health issues:      ABDOMINAL PAIN     Onset: on and off since 2010 but worse in the last year    Description:   Character: Cramping  Location: right upper quadrant left upper quadrant  Radiation: None    Intensity: moderate    Progression of Symptoms:  worsening    Accompanying Signs & Symptoms:  Fever/Chills?: YES  Gas/Bloating: YES  Nausea: no   Vomitting: no   Diarrhea?: YES on and off  Constipation:YES  Dysuria or Hematuria: no    History:   Trauma: no   Previous similar pain: no    Previous tests done: Colonoscopy, x-ray    Precipitating factors:   Does the pain change with:     Food: no      BM: YES    Urination: no     Alleviating factors:  nothing    Therapies Tried and outcome: Tums, Milk of magnesia, over the counter medication.    LMP:  not applicable     He had a colonoscopy that showed the following:     Findings:        The perianal examination was normal.        The digital rectal exam findings include decreased sphincter tone.        Localized moderate inflammation characterized by congestion (edema),        erythema and friability was found in the rectum. Biopsies were taken        with a cold forceps for histology. The mucosa was not firm to touch with        forceps, nor was the mucosal abnormality mass-like, as you could distend        it somewhat with enough insufflation.        The exam was otherwise without abnormality.                                                                                     Impression:          - Decreased sphincter tone found on digital rectal exam.                        - Localized moderate inflammation was found in the                        rectum secondary to proctitis. Biopsied.                        - The examination was otherwise normal.   Recommendation:      - Discharge patient to home.                        - Resume previous diet.                         - Continue present medications.                        - Await pathology results.     Biopsy results showed:     FINAL DIAGNOSIS:   Rectum, biopsy:   - Colonic-type mucosa with patchy ischemic pattern of injury - see comment     COMMENT:   The colonic mucosa shows areas of crypt atrophy, fibrosis of lamina   propria, crypt architectural distortion   and surface denudation with reactive epithelial changes.  Focal   hemosiderin is seen.  There is no evidence of   active inflammation, pseudomembranes, viral cytopathic changes,   granulomas, dysplasia or malignancy.  This   ischemic pattern of injury may be seen in association with low blood flow,    thromboses and emboli, mechanical   (e.g. adhesions), vasculitis, diabetes, trauma, hematologic disorders   (e.g. protein C/S antithrombin   deficiencies), certain medications (e.g. oral contraceptives) and   infections (e.g. CMV).  Correlation with   clinical and endoscopic findings is recommended as clinically deemed   appropriate.     Electronically signed out by:     Tex Sr M.D., PhD       He is set up to see gastroenterology next week, but is having increased pain.  He also reports rectal pressure and fullness.  Still having bright red blood per rectum and nausea.  No vomiting.  Has lost about 13 pounds over the past few months as he has trouble eating due to pain.  No fevers/chills.  Has not taken anything for the pain.       Problem list and histories reviewed & adjusted, as indicated.  Additional history: as documented    Patient Active Problem List   Diagnosis     Bilateral primary osteoarthritis of knee     Chronic pain of both knees     Past Surgical History:   Procedure Laterality Date     COLONOSCOPY N/A 5/7/2018    Procedure: COMBINED COLONOSCOPY, SINGLE OR MULTIPLE BIOPSY/POLYPECTOMY BY BIOPSY;  Colonoscopy with biopsy by biopsy forceps;  Surgeon: Nathaniel Cook DO;  Location:  GI     ORTHOPEDIC SURGERY  1985 &1996    Right & Left knee  "replacements       Social History   Substance Use Topics     Smoking status: Current Every Day Smoker     Packs/day: 0.30     Types: Cigarettes     Smokeless tobacco: Current User     Types: Chew     Alcohol use Yes     History reviewed. No pertinent family history.      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    OBJECTIVE:     /70 (BP Location: Left arm, Patient Position: Chair, Cuff Size: Adult Regular)  Pulse 88  Temp 99.1  F (37.3  C) (Tympanic)  Resp 16  Ht 5' 6.25\" (1.683 m)  Wt 135 lb (61.2 kg)  SpO2 99%  BMI 21.63 kg/m2  Body mass index is 21.63 kg/(m^2).  GENERAL: healthy, alert and no distress  NECK: no adenopathy, no asymmetry, masses, or scars and thyroid normal to palpation  RESP: lungs clear to auscultation - no rales, rhonchi or wheezes  CV: regular rate and rhythm, normal S1 S2, no S3 or S4, no murmur, " click or rub, no peripheral edema and peripheral pulses strong  ABDOMEN: tenderness epigastric and LLQ, no organomegaly or masses and bowel sounds normal  MS: no gross musculoskeletal defects noted, no edema    Diagnostic Test Results:  none     ASSESSMENT/PLAN:         1. Abdominal pain, epigastric  Seeing Gastroenterology next week. Will treat with Omeprazole for his epigastric pain and oral steroids for likely inflammatory bowel disease.  - omeprazole (PRILOSEC) 20 MG CR capsule; Take 1 capsule (20 mg) by mouth daily  Dispense: 30 capsule; Refill: 1  - predniSONE (DELTASONE) 20 MG tablet; Take 2 tablets (40 mg) by mouth daily for 5 days  Dispense: 10 tablet; Refill: 0    2. Rectal pain  - predniSONE (DELTASONE) 20 MG tablet; Take 2 tablets (40 mg) by mouth daily for 5 days  Dispense: 10 tablet; Refill: 0    See Patient Instructions  See GI next week as scheduled for further evaluation.     WILEY Waddell [facility]

## 2018-06-06 NOTE — NURSING NOTE
Health Maintenance Due   Topic Date Due     WILBERTO QUESTIONNAIRE 1 YEAR  02/11/1986     HIV SCREEN (SYSTEM ASSIGNED)  02/11/1986     PHQ-9 Q1YR  02/11/1986     LIPID SCREEN Q5 YR MALE (SYSTEM ASSIGNED)  02/11/2003     TETANUS IMMUNIZATION (SYSTEM ASSIGNED)  02/27/2013     Nakita MAYER LPN

## 2018-06-06 NOTE — TELEPHONE ENCOUNTER
Gail had a cancellation today. Patient scheduled at 3:20. Will call to confirm appointment time.     Janey Vieyra RN  [facility]

## 2018-06-07 ENCOUNTER — TELEPHONE (OUTPATIENT)
Dept: FAMILY MEDICINE | Facility: OTHER | Age: 48
End: 2018-06-07

## 2018-06-07 ASSESSMENT — PATIENT HEALTH QUESTIONNAIRE - PHQ9: SUM OF ALL RESPONSES TO PHQ QUESTIONS 1-9: 12

## 2018-06-07 ASSESSMENT — ANXIETY QUESTIONNAIRES: GAD7 TOTAL SCORE: 3

## 2018-06-07 NOTE — TELEPHONE ENCOUNTER
Reason for Call:  Note for work     Detailed comments: Pt calling. He needs a note excusing him from work this week- Sun, Mon, Tues, Wed, Thurs & Friday. He was seen yesterday. I did inform him you may not be able to write the note for the days before his appt. He states he feels he cannot go back yet today or tomorrow. He is hoping to return to work on Monday. Please call when ready for  at the number below.     Phone Number Patient can be reached at: Home number on file [phone] (home)    Best Time: any     Can we leave a detailed message on this number? YES    Call taken on 6/7/2018 at 9:54 AM by Patria De Paz

## 2018-06-07 NOTE — LETTER
[facility]  [address] [facility] [address]  Phone: [phone]    June 7, 2018        Misha Nicole  [address] [facility] [address]          To whom it may concern:    RE: Misha GEOR[place] Nicole    He was seen in our clinic and missed work during the week of 6/3/2018-6/10/2018.  He is able to return to work on 6/11/2018.    Please contact me for questions or concerns.      Sincerely,        WILEY Waddell CNP

## 2018-06-12 ENCOUNTER — TRANSFERRED RECORDS (OUTPATIENT)
Dept: HEALTH INFORMATION MANAGEMENT | Facility: CLINIC | Age: 48
End: 2018-06-12

## 2018-07-24 ENCOUNTER — TRANSFERRED RECORDS (OUTPATIENT)
Dept: HEALTH INFORMATION MANAGEMENT | Facility: CLINIC | Age: 48
End: 2018-07-24

## 2018-08-16 ENCOUNTER — TELEPHONE (OUTPATIENT)
Dept: FAMILY MEDICINE | Facility: OTHER | Age: 48
End: 2018-08-16

## 2018-08-16 DIAGNOSIS — R10.13 ABDOMINAL PAIN, EPIGASTRIC: Primary | ICD-10-CM

## 2018-08-16 RX ORDER — OMEPRAZOLE 40 MG/1
40 CAPSULE, DELAYED RELEASE ORAL DAILY
Qty: 30 CAPSULE | Refills: 1 | Status: SHIPPED | OUTPATIENT
Start: 2018-08-16 | End: 2021-11-17

## 2018-08-16 NOTE — TELEPHONE ENCOUNTER
Sent omeprazole 40mg to be taken daily to the [facility] pharmacy.    Speedy Mireles PA-C on 8/16/2018 at 10:02 AM

## 2018-08-16 NOTE — TELEPHONE ENCOUNTER
Reason for Call:  Medication or medication refill:    Do you use a [facility]?  Name of the pharmacy and phone number for the current request:  [facility] - [phone]    Name of the medication requested: Omeprazole 20mg     Other request: Patient calling and states he needs a refill of this medication but would like to increase the dose because it's not helping. Patient states he saw a specialist on 7.24 and he does not have crones but still has ulcers and continue this medication. Please advise if there is a covering provider that can refill this in Gail Vargas absence.      Can we leave a detailed message on this number? YES    Phone number patient can be reached at: Cell number on file:    Telephone Information:   Mobile [phone]       Best Time: any    Call taken on 8/16/2018 at 7:06 AM by Karina Mireles

## 2021-03-26 ENCOUNTER — HOSPITAL ENCOUNTER (EMERGENCY)
Facility: CLINIC | Age: 51
Discharge: HOME | End: 2021-03-26
Attending: EMERGENCY MEDICINE | Admitting: EMERGENCY MEDICINE
Payer: MEDICAID

## 2021-03-26 ENCOUNTER — APPOINTMENT (OUTPATIENT)
Dept: CT IMAGING | Facility: CLINIC | Age: 51
End: 2021-03-26
Attending: EMERGENCY MEDICINE
Payer: MEDICAID

## 2021-03-26 VITALS
RESPIRATION RATE: 20 BRPM | BODY MASS INDEX: 24.03 KG/M2 | SYSTOLIC BLOOD PRESSURE: 135 MMHG | TEMPERATURE: 97.6 F | HEART RATE: 77 BPM | DIASTOLIC BLOOD PRESSURE: 88 MMHG | WEIGHT: 150 LBS | OXYGEN SATURATION: 100 %

## 2021-03-26 DIAGNOSIS — R10.84 ABDOMINAL PAIN, GENERALIZED: ICD-10-CM

## 2021-03-26 DIAGNOSIS — K60.2 ANAL FISSURE: ICD-10-CM

## 2021-03-26 DIAGNOSIS — I86.8: ICD-10-CM

## 2021-03-26 LAB
ALBUMIN SERPL-MCNC: 3.3 G/DL (ref 3.4–5)
ALBUMIN UR-MCNC: NEGATIVE MG/DL
ALP SERPL-CCNC: 86 U/L (ref 40–150)
ALT SERPL W P-5'-P-CCNC: 22 U/L (ref 0–70)
ANION GAP SERPL CALCULATED.3IONS-SCNC: 2 MMOL/L (ref 3–14)
APPEARANCE UR: CLEAR
AST SERPL W P-5'-P-CCNC: 11 U/L (ref 0–45)
BASOPHILS # BLD AUTO: 0 10E9/L (ref 0–0.2)
BASOPHILS NFR BLD AUTO: 0.6 %
BILIRUB SERPL-MCNC: 0.3 MG/DL (ref 0.2–1.3)
BILIRUB UR QL STRIP: NEGATIVE
BUN SERPL-MCNC: 12 MG/DL (ref 7–30)
CALCIUM SERPL-MCNC: 8.4 MG/DL (ref 8.5–10.1)
CHLORIDE SERPL-SCNC: 106 MMOL/L (ref 94–109)
CO2 SERPL-SCNC: 28 MMOL/L (ref 20–32)
COLOR UR AUTO: YELLOW
CREAT SERPL-MCNC: 0.82 MG/DL (ref 0.66–1.25)
CRP SERPL-MCNC: 3.8 MG/L (ref 0–8)
DIFFERENTIAL METHOD BLD: ABNORMAL
EOSINOPHIL # BLD AUTO: 0.1 10E9/L (ref 0–0.7)
EOSINOPHIL NFR BLD AUTO: 1.4 %
ERYTHROCYTE [DISTWIDTH] IN BLOOD BY AUTOMATED COUNT: 13.4 % (ref 10–15)
ERYTHROCYTE [SEDIMENTATION RATE] IN BLOOD BY WESTERGREN METHOD: 8 MM/H (ref 0–20)
FLUAV RNA RESP QL NAA+PROBE: NEGATIVE
FLUBV RNA RESP QL NAA+PROBE: NEGATIVE
GFR SERPL CREATININE-BSD FRML MDRD: >90 ML/MIN/{1.73_M2}
GLUCOSE SERPL-MCNC: 79 MG/DL (ref 70–99)
GLUCOSE UR STRIP-MCNC: NEGATIVE MG/DL
HCT VFR BLD AUTO: 39.5 % (ref 40–53)
HEMOCCULT STL QL: NEGATIVE
HGB BLD-MCNC: 12.9 G/DL (ref 13.3–17.7)
HGB UR QL STRIP: NEGATIVE
IMM GRANULOCYTES # BLD: 0 10E9/L (ref 0–0.4)
IMM GRANULOCYTES NFR BLD: 0.3 %
INR PPP: 0.94 (ref 0.86–1.14)
KETONES UR STRIP-MCNC: NEGATIVE MG/DL
LABORATORY COMMENT REPORT: NORMAL
LEUKOCYTE ESTERASE UR QL STRIP: NEGATIVE
LIPASE SERPL-CCNC: 83 U/L (ref 73–393)
LYMPHOCYTES # BLD AUTO: 1.4 10E9/L (ref 0.8–5.3)
LYMPHOCYTES NFR BLD AUTO: 22.5 %
MCH RBC QN AUTO: 31.3 PG (ref 26.5–33)
MCHC RBC AUTO-ENTMCNC: 32.7 G/DL (ref 31.5–36.5)
MCV RBC AUTO: 96 FL (ref 78–100)
MONOCYTES # BLD AUTO: 0.4 10E9/L (ref 0–1.3)
MONOCYTES NFR BLD AUTO: 5.8 %
NEUTROPHILS # BLD AUTO: 4.5 10E9/L (ref 1.6–8.3)
NEUTROPHILS NFR BLD AUTO: 69.4 %
NITRATE UR QL: NEGATIVE
NRBC # BLD AUTO: 0 10*3/UL
NRBC BLD AUTO-RTO: 0 /100
PH UR STRIP: 5 PH (ref 5–7)
PLATELET # BLD AUTO: 250 10E9/L (ref 150–450)
POTASSIUM SERPL-SCNC: 4.2 MMOL/L (ref 3.4–5.3)
PROT SERPL-MCNC: 7 G/DL (ref 6.8–8.8)
RBC # BLD AUTO: 4.12 10E12/L (ref 4.4–5.9)
RSV RNA SPEC QL NAA+PROBE: NORMAL
SARS-COV-2 RNA RESP QL NAA+PROBE: NEGATIVE
SODIUM SERPL-SCNC: 136 MMOL/L (ref 133–144)
SOURCE: NORMAL
SP GR UR STRIP: 1.03 (ref 1–1.03)
SPECIMEN SOURCE: NORMAL
UROBILINOGEN UR STRIP-MCNC: 2 MG/DL (ref 0–2)
WBC # BLD AUTO: 6.4 10E9/L (ref 4–11)

## 2021-03-26 PROCEDURE — 85610 PROTHROMBIN TIME: CPT | Performed by: EMERGENCY MEDICINE

## 2021-03-26 PROCEDURE — 85025 COMPLETE CBC W/AUTO DIFF WBC: CPT | Performed by: EMERGENCY MEDICINE

## 2021-03-26 PROCEDURE — 81003 URINALYSIS AUTO W/O SCOPE: CPT | Performed by: EMERGENCY MEDICINE

## 2021-03-26 PROCEDURE — 96374 THER/PROPH/DIAG INJ IV PUSH: CPT | Mod: 59 | Performed by: EMERGENCY MEDICINE

## 2021-03-26 PROCEDURE — 74177 CT ABD & PELVIS W/CONTRAST: CPT

## 2021-03-26 PROCEDURE — 85652 RBC SED RATE AUTOMATED: CPT | Performed by: EMERGENCY MEDICINE

## 2021-03-26 PROCEDURE — 80053 COMPREHEN METABOLIC PANEL: CPT | Performed by: EMERGENCY MEDICINE

## 2021-03-26 PROCEDURE — 87636 SARSCOV2 & INF A&B AMP PRB: CPT | Performed by: EMERGENCY MEDICINE

## 2021-03-26 PROCEDURE — 99284 EMERGENCY DEPT VISIT MOD MDM: CPT | Performed by: EMERGENCY MEDICINE

## 2021-03-26 PROCEDURE — 83690 ASSAY OF LIPASE: CPT | Performed by: EMERGENCY MEDICINE

## 2021-03-26 PROCEDURE — 99285 EMERGENCY DEPT VISIT HI MDM: CPT | Mod: 25 | Performed by: EMERGENCY MEDICINE

## 2021-03-26 PROCEDURE — 250N000011 HC RX IP 250 OP 636: Performed by: EMERGENCY MEDICINE

## 2021-03-26 PROCEDURE — C9803 HOPD COVID-19 SPEC COLLECT: HCPCS | Performed by: EMERGENCY MEDICINE

## 2021-03-26 PROCEDURE — 86140 C-REACTIVE PROTEIN: CPT | Performed by: EMERGENCY MEDICINE

## 2021-03-26 PROCEDURE — 82272 OCCULT BLD FECES 1-3 TESTS: CPT | Performed by: EMERGENCY MEDICINE

## 2021-03-26 PROCEDURE — 250N000009 HC RX 250: Performed by: EMERGENCY MEDICINE

## 2021-03-26 PROCEDURE — C9113 INJ PANTOPRAZOLE SODIUM, VIA: HCPCS | Performed by: EMERGENCY MEDICINE

## 2021-03-26 RX ORDER — IOPAMIDOL 755 MG/ML
100 INJECTION, SOLUTION INTRAVASCULAR ONCE
Status: COMPLETED | OUTPATIENT
Start: 2021-03-26 | End: 2021-03-26

## 2021-03-26 RX ORDER — IBUPROFEN 200 MG
800 TABLET ORAL EVERY 8 HOURS PRN
COMMUNITY

## 2021-03-26 RX ORDER — SUMATRIPTAN 50 MG/1
50 TABLET, FILM COATED ORAL
Qty: 12 TABLET | Refills: 0 | Status: SHIPPED | OUTPATIENT
Start: 2021-03-26

## 2021-03-26 RX ADMIN — IOPAMIDOL 75 ML: 755 INJECTION, SOLUTION INTRAVENOUS at 11:53

## 2021-03-26 RX ADMIN — PANTOPRAZOLE SODIUM 40 MG: 40 INJECTION, POWDER, FOR SOLUTION INTRAVENOUS at 11:41

## 2021-03-26 RX ADMIN — SODIUM CHLORIDE 60 ML: 9 INJECTION, SOLUTION INTRAVENOUS at 11:53

## 2021-03-26 NOTE — ED PROVIDER NOTES
"  History     Chief Complaint   Patient presents with     Abdominal Pain     HPI  Misha Nicole is a 51 year old male who resents to the emergency room for abdominal pain and rectal bleeding.  He says that this has been an ongoing problem, and has been getting worse over the last 6 months.  He was having issues with insurance for period of time and has not been able to be seen and evaluated.  He recently moved back to [place] from [place] and is coming back to the emergency room since \"you guys help me out before\".    Misha notes abdominal pain.  He describes it as constant.  It does not radiate.  Though some foods will make it worse, he just cannot pinpoint a particular type of food that exacerbates his symptoms.  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  He does occasionally have nausea but has not had any vomiting.  He has difficulty with bowel movements.  Frequently feels like he needs to strain.  Can go several days without having a bowel movement.  When he is able to go, stool is very hard and pellet-like.  He states that he has to wear 2 pairs of underwear and sometimes wear a pad at work to help with the bleeding.  He says that he passes clots and will notice bright red blood per rectum.  This alternates with white/yellow mucus.  He also feels like \"something is coming out\".    Admits to frequent ibuprofen use.  Says that he took 7 over-the-counter tablets this morning.  He has tried to cut back his use of ibuprofen, but takes it for headaches as well as low back pain from his job.  He has also tried taking stool softeners and MiraLAX to help have a bowel movement.  He denies any history of anal penetration or sexual contact.  No family history of inflammatory bowel disease, IBS, or GI related cancers.  His last colonoscopy was 2 years ago when he presented to this facility for similar concerns.  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    Past Surgical History:    Past Surgical History:   Procedure Laterality Date     COLONOSCOPY N/A 5/7/2018    Procedure: COMBINED COLONOSCOPY, SINGLE OR MULTIPLE BIOPSY/POLYPECTOMY BY BIOPSY;  Colonoscopy with biopsy by biopsy forceps;  Surgeon: Nathaniel Cook DO;  Location:  GI     ORTHOPEDIC SURGERY  1985 &1996    Right & Left knee replacements       Family History:    No family history on file.    Social History:  Marital Status:  Single [1]  Social History     Tobacco Use     Smoking status: Current Every Day Smoker     Packs/day: 0.30     Types: Cigarettes     Smokeless tobacco: Current User     Types: Chew   Substance Use Topics     Alcohol use: Yes     Drug use: No        Medications:    ibuprofen (ADVIL/MOTRIN) 200 MG tablet  omeprazole (PRILOSEC) 20 MG DR capsule  SUMAtriptan (IMITREX) 50 MG tablet  albuterol (PROAIR HFA) 108 (90 Base) MCG/ACT Inhaler  omeprazole (PRILOSEC) 40 MG capsule          Review of Systems   All other systems reviewed and are negative.      Physical Exam   BP: 130/79  Pulse: 77  Temp: 98  F (36.7  C)  Resp: 20  Weight: 68 kg (150 lb)  SpO2: 100 %      Physical Exam  Vitals signs and nursing note reviewed.   Constitutional:       General: He is not in acute distress.     Appearance: He is not diaphoretic.   HENT:      Head: Atraumatic.   Eyes:      General: No scleral icterus.     Pupils: Pupils are equal, round, and reactive to light.   Cardiovascular:      Heart sounds: Normal heart sounds.   Pulmonary:      Effort: No " respiratory distress.      Breath sounds: Normal breath sounds.   Abdominal:      General: Bowel sounds are normal.      Palpations: Abdomen is soft.      Tenderness: There is abdominal tenderness in the epigastric area and left upper quadrant. There is no right CVA tenderness, left CVA tenderness, guarding or rebound.   Genitourinary:     Rectum: Anal fissure present. No mass or external hemorrhoid.       Musculoskeletal:         General: No tenderness.   Skin:     General: Skin is warm.      Findings: No rash.   Neurological:      Mental Status: He is alert.         ED Course        Procedures               Critical Care time:  none               Results for orders placed or performed during the hospital encounter of 03/26/21 (from the past 24 hour(s))   Occult blood stool   Result Value Ref Range    Occult Blood Negative NEG^Negative   CBC with platelets differential   Result Value Ref Range    WBC 6.4 4.0 - 11.0 10e9/L    RBC Count 4.12 (L) 4.4 - 5.9 10e12/L    Hemoglobin 12.9 (L) 13.3 - 17.7 g/dL    Hematocrit 39.5 (L) 40.0 - 53.0 %    MCV 96 78 - 100 fl    MCH 31.3 26.5 - 33.0 pg    MCHC 32.7 31.5 - 36.5 g/dL    RDW 13.4 10.0 - 15.0 %    Platelet Count 250 150 - 450 10e9/L    Diff Method Automated Method     % Neutrophils 69.4 %    % Lymphocytes 22.5 %    % Monocytes 5.8 %    % Eosinophils 1.4 %    % Basophils 0.6 %    % Immature Granulocytes 0.3 %    Nucleated RBCs 0 0 /100    Absolute Neutrophil 4.5 1.6 - 8.3 10e9/L    Absolute Lymphocytes 1.4 0.8 - 5.3 10e9/L    Absolute Monocytes 0.4 0.0 - 1.3 10e9/L    Absolute Eosinophils 0.1 0.0 - 0.7 10e9/L    Absolute Basophils 0.0 0.0 - 0.2 10e9/L    Abs Immature Granulocytes 0.0 0 - 0.4 10e9/L    Absolute Nucleated RBC 0.0    INR   Result Value Ref Range    INR 0.94 0.86 - 1.14   Comprehensive metabolic panel   Result Value Ref Range    Sodium 136 133 - 144 mmol/L    Potassium 4.2 3.4 - 5.3 mmol/L    Chloride 106 94 - 109 mmol/L    Carbon Dioxide 28 20 - 32 mmol/L     Anion Gap 2 (L) 3 - 14 mmol/L    Glucose 79 70 - 99 mg/dL    Urea Nitrogen 12 7 - 30 mg/dL    Creatinine 0.82 0.66 - 1.25 mg/dL    GFR Estimate >90 >60 mL/min/[1.73_m2]    GFR Estimate If Black >90 >60 mL/min/[1.73_m2]    Calcium 8.4 (L) 8.5 - 10.1 mg/dL    Bilirubin Total 0.3 0.2 - 1.3 mg/dL    Albumin 3.3 (L) 3.4 - 5.0 g/dL    Protein Total 7.0 6.8 - 8.8 g/dL    Alkaline Phosphatase 86 40 - 150 U/L    ALT 22 0 - 70 U/L    AST 11 0 - 45 U/L   Lipase   Result Value Ref Range    Lipase 83 73 - 393 U/L   CRP inflammation   Result Value Ref Range    CRP Inflammation 3.8 0.0 - 8.0 mg/L   Erythrocyte sedimentation rate auto   Result Value Ref Range    Sed Rate 8 0 - 20 mm/h   UA reflex to Microscopic and Culture    Specimen: Midstream Urine   Result Value Ref Range    Color Urine Yellow     Appearance Urine Clear     Glucose Urine Negative NEG^Negative mg/dL    Bilirubin Urine Negative NEG^Negative    Ketones Urine Negative NEG^Negative mg/dL    Specific Gravity Urine 1.033 1.003 - 1.035    Blood Urine Negative NEG^Negative    pH Urine 5.0 5.0 - 7.0 pH    Protein Albumin Urine Negative NEG^Negative mg/dL    Urobilinogen mg/dL 2.0 0.0 - 2.0 mg/dL    Nitrite Urine Negative NEG^Negative    Leukocyte Esterase Urine Negative NEG^Negative    Source Midstream Urine    Symptomatic Influenza A/B & SARS-CoV2 (COVID-19) Virus PCR Multiplex    Specimen: Nasopharyngeal   Result Value Ref Range    Flu A/B & SARS-COV-2 PCR Source Nasopharyngeal     SARS-CoV-2 PCR Result NEGATIVE     Influenza A PCR Negative NEG^Negative    Influenza B PCR Negative NEG^Negative    Respiratory Syncytial Virus PCR (Note)     Flu A/B & SARS-CoV-2 PCR Comment (Note)    CT Abdomen Pelvis w Contrast    Narrative    CT ABDOMEN PELVIS W CONTRAST 3/26/2021 12:12 PM    CLINICAL HISTORY: Abdominal distension; Abdominal pain, acute,  nonlocalized    TECHNIQUE: CT scan of the abdomen and pelvis was performed following  injection of IV contrast. Multiplanar  reformats were obtained. Dose  reduction techniques were used.  CONTRAST: Isovue-370, 75mL    COMPARISON: 4/14/2018    FINDINGS:   LOWER CHEST: Normal.    HEPATOBILIARY: Normal.    PANCREAS: Normal.    SPLEEN: Normal.    ADRENAL GLANDS: Normal.    KIDNEYS/BLADDER: Normal.    BOWEL: There are several mildly dilated draining veins in the jejunum  seen series 3 images 68 through 100, presumably varices. There is wall  thickening of the rectum with prominent perirectal veins. No  obstruction. Remainder of the bowel is unremarkable.    PELVIC ORGANS: Normal.    ADDITIONAL FINDINGS: None.    MUSCULOSKELETAL: Normal.      Impression    IMPRESSION:   1.  Wall thickening of the distal rectum with prominent perirectal  veins. This is indeterminant, and could be related to neoplasia, a  vascular lesion or prominent varices. Recommend direct visualization.  2.  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    MAVERICK ZAMORA MD       Medications   sodium chloride (PF) 0.9% PF flush 3 mL (3 mLs Intravenous Given 3/26/21 1152)   iopamidol (ISOVUE-370) solution 100 mL (75 mLs Intravenous Given 3/26/21 1153)   sodium chloride 0.9 % bag 500mL for CT scan flush use (60 mLs As instructed Given 3/26/21 1153)   pantoprazole (PROTONIX) IV push injection 40 mg (40 mg Intravenous Given 3/26/21 1141)       Assessments & Plan (with Medical Decision Making)  Misha is a 51-year-old male who presents to the emergency room for abdominal pain and rectal bleeding.  This is been an ongoing problem for him for several years, and has gotten worse over the last 6 months.  He has been unable to seek care prior to this due to issues with insurance.  He is returning to the ER today because he was happy with the care he received here prior.  See history focused physical exam as above  51-year-old male in no acute distress, vital signs are stable and he is afebrile.  He does have mild tenderness to palpation of his abdomen in the  epigastric region and left upper quadrant without any rebound, guarding, or rigidity.  Normal bowel sounds.  Rectal exam was performed and did not show any external hemorrhoids or rectal prolapse.  There was an anal fissure, see diagram above.  No stool noted on BEE, but did have clear mucousy discharge.  This was tested and was occult negative.  Lab results as above.  Hemoglobin is just below normal limits at 12.9, but no need for acute transfusion.  Remainder of labs are largely within normal limits.  CT abdomen pelvis shows varices around the jejunum and in the rectum.  No abscess, or concern for perforation.  Explained the results to the patient.  I do think that he needs to be seen by GI again.  He may also benefit from a general surgery consult, but will make referral for the patient to see GI first since they have managed him in the past.  Was given medicine for omeprazole and encouraged to continue to use MiraLAX or stool softener so that he does not have to strain.  I also advised the patient to avoid ibuprofen as this may be contributing to some of his issues.  Given a small quantity of sumatriptan to help with his reported history of migraine.  Encouraged him to follow-up closely with his primary provider and discussed reasons to return to the emergency room.  He understands and agrees and is discharged in no acute distress.     I have reviewed the nursing notes.    I have reviewed the findings, diagnosis, plan and need for follow up with the patient.       Discharge Medication List as of 3/26/2021  1:13 PM      START taking these medications    Details   !! omeprazole (PRILOSEC) 20 MG DR capsule Take 2 capsules (40 mg) by mouth daily, Disp-60 capsule, R-0, E-Prescribe      SUMAtriptan (IMITREX) 50 MG tablet Take 1 tablet (50 mg) by mouth at onset of headache for migraine May repeat in 2 hours. Max 4 tablets/24 hours., Disp-12 tablet, R-0, E-Prescribe       !! - Potential duplicate medications found.  Please discuss with provider.          Final diagnoses:   Abdominal pain, generalized   Anal fissure   Jejunal varices       3/26/2021   [facility] EMERGENCY DEPT     Claire Carias DO  04/01/21 2009

## 2021-03-26 NOTE — ED NOTES
Discharge reviewed with patient and phone number for financial counselor given per his request for assistance obtaining health insurance. He will return as needed.

## 2021-03-26 NOTE — DISCHARGE INSTRUCTIONS
Your lab work today was reassuring, and your globin was just below the lower normal limit.  You do not need any blood transfusion.  You do not show signs of inflammation on your blood work    Your CT scan did not show any blockages.  However there were signs of varices in your small intestine as well as down near your rectum.  These may be causing your bleeding, or you may have additional bleeding from the anal fissure that was noted.    You should be evaluated by a GI specialist.  A referral was made from the emergency room today.  However if they are unable to accommodate you in their schedule, or if you would prefer a different provider, you could seek care at [facility].  They have multiple locations, you would just need to make a call and request a certain location.    You should stop using the ibuprofen.  This can be very hard on your stomach and lead to additional bleeding.  If you need over-the-counter medication for mild pain, you may take a small amount of Tylenol, 2 tablets of extra strength Tylenol every 8 hours for a total of 3000 mg of Tylenol in a 24-hour period.  Do not take more Tylenol than this    A prescription was also sent to your pharmacy for a medication called Prilosec.  You should take this daily to help protect your stomach    A prescription was also sent for your history of migraines.  You may try taking 1 tablet of the Imitrex at onset of migraine pain.  May take a second tablet 2 hours later if no relief.    Should follow with your primary provider in regards to your migraines as well as other general medical care.  May do this as needed.    If you become dizzy or lightheaded, are vomiting or vomiting blood, or have any new or concerning symptoms, do not hesitate to return to the emergency room.

## 2021-03-26 NOTE — LETTER
March 26, 2021      To Whom It May Concern:      Misha Nicole was seen in our Emergency Department today, 03/26/21.  I expect his condition to improve over the next 1-2 days.  He may return to work/school when improved.  Was tested for COVID-19 and had a confirmed negative test today    Sincerely,        Claire Carias, DO

## 2021-04-12 ENCOUNTER — TELEPHONE (OUTPATIENT)
Dept: FAMILY MEDICINE | Facility: CLINIC | Age: 51
End: 2021-04-12

## 2021-04-12 ENCOUNTER — OFFICE VISIT (OUTPATIENT)
Dept: FAMILY MEDICINE | Facility: CLINIC | Age: 51
End: 2021-04-12
Payer: MEDICAID

## 2021-04-12 VITALS
DIASTOLIC BLOOD PRESSURE: 70 MMHG | HEIGHT: 67 IN | TEMPERATURE: 98.6 F | OXYGEN SATURATION: 98 % | HEART RATE: 101 BPM | RESPIRATION RATE: 20 BRPM | SYSTOLIC BLOOD PRESSURE: 138 MMHG | WEIGHT: 147.38 LBS | BODY MASS INDEX: 23.13 KG/M2

## 2021-04-12 DIAGNOSIS — Z09 HOSPITAL DISCHARGE FOLLOW-UP: Primary | ICD-10-CM

## 2021-04-12 DIAGNOSIS — R10.84 ABDOMINAL PAIN, GENERALIZED: ICD-10-CM

## 2021-04-12 DIAGNOSIS — K62.5 RECTAL BLEEDING: ICD-10-CM

## 2021-04-12 DIAGNOSIS — R63.4 WEIGHT LOSS: ICD-10-CM

## 2021-04-12 DIAGNOSIS — R39.11 URINARY HESITANCY: ICD-10-CM

## 2021-04-12 LAB
BASOPHILS # BLD AUTO: 0.1 10E9/L (ref 0–0.2)
BASOPHILS NFR BLD AUTO: 0.9 %
DIFFERENTIAL METHOD BLD: ABNORMAL
EOSINOPHIL # BLD AUTO: 0.3 10E9/L (ref 0–0.7)
EOSINOPHIL NFR BLD AUTO: 3.8 %
ERYTHROCYTE [DISTWIDTH] IN BLOOD BY AUTOMATED COUNT: 13.9 % (ref 10–15)
HCT VFR BLD AUTO: 37.9 % (ref 40–53)
HGB BLD-MCNC: 12.6 G/DL (ref 13.3–17.7)
IMM GRANULOCYTES # BLD: 0 10E9/L (ref 0–0.4)
IMM GRANULOCYTES NFR BLD: 0.3 %
LYMPHOCYTES # BLD AUTO: 1.4 10E9/L (ref 0.8–5.3)
LYMPHOCYTES NFR BLD AUTO: 20.6 %
MCH RBC QN AUTO: 31.8 PG (ref 26.5–33)
MCHC RBC AUTO-ENTMCNC: 33.2 G/DL (ref 31.5–36.5)
MCV RBC AUTO: 96 FL (ref 78–100)
MONOCYTES # BLD AUTO: 0.5 10E9/L (ref 0–1.3)
MONOCYTES NFR BLD AUTO: 7.2 %
NEUTROPHILS # BLD AUTO: 4.4 10E9/L (ref 1.6–8.3)
NEUTROPHILS NFR BLD AUTO: 67.2 %
NRBC # BLD AUTO: 0 10*3/UL
NRBC BLD AUTO-RTO: 0 /100
PLATELET # BLD AUTO: 252 10E9/L (ref 150–450)
PSA SERPL-ACNC: 2.32 UG/L (ref 0–4)
RBC # BLD AUTO: 3.96 10E12/L (ref 4.4–5.9)
WBC # BLD AUTO: 6.6 10E9/L (ref 4–11)

## 2021-04-12 PROCEDURE — 85025 COMPLETE CBC W/AUTO DIFF WBC: CPT | Performed by: NURSE PRACTITIONER

## 2021-04-12 PROCEDURE — 99204 OFFICE O/P NEW MOD 45 MIN: CPT | Performed by: NURSE PRACTITIONER

## 2021-04-12 PROCEDURE — 36415 COLL VENOUS BLD VENIPUNCTURE: CPT | Performed by: NURSE PRACTITIONER

## 2021-04-12 PROCEDURE — G0103 PSA SCREENING: HCPCS | Performed by: NURSE PRACTITIONER

## 2021-04-12 ASSESSMENT — PATIENT HEALTH QUESTIONNAIRE - PHQ9: SUM OF ALL RESPONSES TO PHQ QUESTIONS 1-9: 10

## 2021-04-12 ASSESSMENT — PAIN SCALES - GENERAL: PAINLEVEL: SEVERE PAIN (6)

## 2021-04-12 ASSESSMENT — MIFFLIN-ST. JEOR: SCORE: 1465.77

## 2021-04-12 NOTE — TELEPHONE ENCOUNTER
Called and LM for patient to call back. Please relay results below to patient from [place].   Estelle Davis MA

## 2021-04-12 NOTE — PATIENT INSTRUCTIONS
Patient Education     Treating Constipation  Constipation is a common and often uncomfortable problem. Constipation means you have bowel movements fewer than 3 times per week. Or that you strain to pass hard, dry stool. It can last a short time. Or it can be a problem that never seems to go away. The good news is that it can often be treated and controlled.   Eat more fiber  One of the best ways to help treat constipation is to increase your fiber intake. You can do this either through diet or by using fiber supplements. Fiber (in whole grains, fruits, and vegetables) adds bulk and absorbs water to soften the stool. This helps the stool pass through the colon more easily. When you increase your fiber intake, do it slowly to prevent side effects such as bloating. Also increase the amount of water that you drink. Eating more of these foods can add fiber to your diet:     High-fiber cereals    Whole grains, bran, and brown rice    Vegetables such as carrots, broccoli, and greens    Fresh fruits (especially apples, pears, and dried fruits such as raisins and apricots)    Nuts and legumes (especially beans such as lentils, kidney beans, and lima beans)  Get physically active  Exercise helps improve the working of your colon which helps ease constipation. Try to get some physical activity every day. If you haven t been active for a while, talk with your healthcare provider before starting again.     Laxatives  Your healthcare provider may suggest an over-the-counter product to help ease your constipation. He or she may suggest the use of bulk-forming agents or laxatives. Laxatives, if used as directed, are common and safe. Follow directions carefully when using them. See your provider for new-onset constipation, or long-term constipation, to rule out other causes such as medicines or thyroid disease.   [employer] last reviewed this educational content on 6/1/2019 2000-2021 [employer]. All rights  reserved. This information is not intended as a substitute for professional medical care. Always follow your healthcare professional's instructions.           Patient Education     Coping with Constipation  What is constipation?  If you have hard, dry stools that are difficult to pass, you have constipation. You may also have bloating, gas and stomach cramps.  How is it treated?  If the problem is severe, your care team can suggest medicine to relieve it (a laxative, stool softener or enema). Do not use medicine unless your care team tells you to.  You should not use an enema (rectal wash) if your white blood cell or platelet count is low.  What else can I do to treat or prevent constipation?    Eat at the same times each day.    Add fiber to your diet by eating:  ? Whole grain breads, cereals and pastas  ? Whole grains such as barley or brown rice  ? Raw vegetables  ? Fresh and dried fruits  ? Dried beans and peas  ? Nuts, seeds and popcorn.    Drink lots of fluids: at least eight to ten 8-ounce glasses each day. Try water, prune juice, warm juices, herbal teas and lemonade.    Have a hot drink with high-fiber foods for breakfast.    Eat the skins on fruits and potatoes. Wash them well before eating.    Add wheat bran to cereals, casseroles and homemade breads.    If gas is a problem:  ? Avoid gas-forming foods such carbonated (fizzy) drinks, broccoli, cabbage, cauliflower, dried beans and peas, peppers and onions.  ? Do not use a straw.  ? Do not chew gum.    Stay active. Simply getting out for a walk can help. Increase your exercise as you are able.    Try to use the toilet at the same times each day. Keep a record of your bowel movements.    If you take medicine that may cause constipation, your doctor may ask you to take a stool softener.  When should I call my care team?  Call your care team if:    You do not have a bowel movement for two or more days.    You have sudden, severe belly pain.    You notice blood  in your stool.    You have severe hemorrhoids (swelling, itching and pain around the anus).  Comments:  __________________________________________  __________________________________________  __________________________________________  __________________________________________  __________________________________________  __________________________________________  __________________________________________  __________________________________________  __________________________________________  __________________________________________  For informational purposes only. Not to replace the advice of your health care provider. Copyright   2006 [facility]. All rights reserved. Clinically reviewed by [facility]. SM[employer] 775317 - REV 04/19.           Patient Education     Eating a High-Fiber Diet  Fiber is what gives strength and structure to plants. Most grains, beans, vegetables, and fruits contain fiber. Foods rich in fiber are often low in calories and fat, and they fill you up more. They may also reduce your risks for certain health problems. To find out the amount of fiber in canned, packaged, or frozen foods, read the Nutrition Facts label. It tells you how much fiber is in one serving.    Types of fiber and their benefits  There are two types of fiber: insoluble and soluble. They both aid digestion and help you maintain a healthy weight.    Insoluble fiber. This is found in whole grains, cereals, certain fruits and vegetables such as apple skin, corn, and carrots. Insoluble fiber may prevent constipation and reduce the risk for certain types of cancer. It's called insoluble because it doesn't dissolve in water.    Soluble fiber. This type of fiber is in oats, beans, and certain fruits and vegetables such as strawberries and peas. Soluble fiber can reduce cholesterol, which may help lower the risk for heart disease. It also helps control blood sugar levels.  Look for high-fiber foods  Try  these foods to add fiber to your diet:    Whole-grain breads and cereals. Try to eat 6 to 8 ounces a day. Include wheat and oat bran cereals, whole-wheat muffins or toast, and corn tortillas in your meals.    Fruits. Try to eat 2 cups a day. Apples, oranges, strawberries, pears, and bananas are good sources. (Note: Fruit juice is low in fiber.)    Vegetables. Try to eat at least 2.5 cups a day. Add asparagus, carrots, broccoli, peas, and corn to your meals.    Beans. One cup of cooked lentils gives you over 15 grams of fiber. Try navy beans, lentils, and chickpeas.    Seeds. A small handful of seeds gives you about 3 grams of fiber. Try sunflower or ward seeds.  Keep track of your fiber  Keep track of how much fiber you eat. Start by reading food labels. Then eat a variety of foods high in fiber. As you start to eat more fiber, ask your healthcare provider how much water you should be drinking to keep your digestive system working smoothly.  Aim for a certain amount of fiber in your diet each day. The daily value for fiber is 25 grams a day. But some experts advise that women under 50 eat 25 to 28 grams per day and that men under 50 eat 30 to 38 grams per day. After age 50, your daily fiber needs drop to 22 grams for women and 28 grams for men.  Before you reach for the fiber supplements, think about this. Fiber is found naturally in healthy whole foods. It gives you that feeling of fullness after you eat. Taking fiber supplements or eating fiber-enriched foods will not give you this full feeling.  Your fiber intake is a good measure for the quality of your overall diet. If you are missing out on your daily amount of fiber, you may be lacking other important nutrients as well.  [employer] last reviewed this educational content on 7/1/2019 2000-2021 [employer]. All rights reserved. This information is not intended as a substitute for professional medical care. Always follow your healthcare  professional's instructions.    - Use Miralax 17 gm - 1 capful a day with 8 ounces of water. This is daily in the morning.     Plan for a consult with our GI team then an upper and lower scope to see what is causing the pain and bleeding. They may change the orders after they see you,

## 2021-04-12 NOTE — PROGRESS NOTES
Assessment & Plan     Hospital discharge follow-up  - He is stable, but not improving.   - BM last night, only mucus, blood and only small pellet like stool.   - With the epigastric pain,  smoking, Chewing, and extensive amounts of Pepsi a day, up to 5 liters I am going to order upper and lower GI,   - We will get him set up with GI as well for consult.  - Trying to get him to switch to 7 up and water, trying to at least decrease the caffeine to start. He is going to cut back on chewing.   - I put instructions to take Miralax daily to help with the constipation.   - Abdominal pain is on going but not worse than it was.   - He is taking the omeprazole 40 mg daily will have him continue this.   - Reviewed diet with him and provided information on high fiber diet.   - I will recheck the labs today.     Rectal bleeding  - Not worse, a bit better, but still lots of mucus.   - CBC today.   - As above GI consult has been placed.     Urinary Hesitancy:   - PSA has been ordered today.   - Hesitancy has been going on for a few years.   - Does not feel as though he can fully empty.   - UA was negative in the hospital.       40 minutes spent on the date of the encounter doing chart review, history and exam, documentation and further activities per the note       Tobacco Cessation:   reports that he has been smoking cigarettes. He has been smoking about 0.30 packs per day. His smokeless tobacco use includes chew.  Tobacco Cessation Action Plan: Information offered: Patient not interested at this time    See Patient Instructions    Rio Jose NP  [facility] JACINDA Cabral is a 51 year old who presents for the following health issues     HPI     ED/UC Followup:    Facility:  [facility]  Date of visit: 03/26/2021  Reason for visit: Abdominal pain/ anal fissure   Current Status:Stable but not better.       The GI issues has been going on for years. He drinks Pepsi all day 3-4 liters a  "day, smokes, and chews. Is working on cutting down. We discussed switching to 7 up and water. He is cutting back on the chewing tobacco. He continues to struggle with weight. He has now lost 3 pounds again since March. Trying to gain weight. No fevers that he knows of. Most of the abdominal pain is in the epigastric region, upper middle abdomen. He some nausea but no vomiting. Still using multiple pairs of underwear daily to manage the mucus and blood rectally that is lost. Last BM yesterday but mostly mucus and blood, one small dry pellet like stool. Mood is down as the stomach issues are difficult to deal with. Has not had consistent medical care in some time.       Review of Systems   Constitutional, HEENT, cardiovascular, pulmonary, gi and gu systems are negative, except as otherwise noted.      Objective    /70   Pulse 101   Temp 98.6  F (37  C) (Temporal)   Resp 20   Ht 1.692 m (5' 6.6\")   Wt 66.8 kg (147 lb 6 oz)   SpO2 98%   BMI 23.36 kg/m    Body mass index is 23.36 kg/m .  Physical Exam   GENERAL: alert and no distress  EYES: Eyes grossly normal to inspection, PERRL and conjunctivae and sclerae normal  HENT: ear canals and TM's normal, nose and mouth without ulcers or lesions  NECK: no adenopathy, no asymmetry, masses, or scars and thyroid normal to palpation  RESP: lungs clear to auscultation - no rales, rhonchi or wheezes  CV: no peripheral edema and S1S2 heard.   ABDOMEN: soft, nontender, no hepatosplenomegaly, no masses and bowel sounds normal  MS: no gross musculoskeletal defects noted, no edema  NEURO: Normal strength and tone and sensory exam grossly normal  PSYCH: mentation appears normal and judgement and insight intact    Labs and imaging reviewed in Epic              "

## 2021-04-12 NOTE — LETTER
April 13, 2021      Misha Nicole  [id number] [facility] [address]        Dear ,    We are writing to inform you of your test results.    Your Prostate Specific Antigen test is normal, this tests screens for prostate cancer so a normal level is good. The hemoglobin is stable and as expected, all other lab counts stable as well. This is all good.      Resulted Orders   CBC with platelets and differential   Result Value Ref Range    WBC 6.6 4.0 - 11.0 10e9/L    RBC Count 3.96 (L) 4.4 - 5.9 10e12/L    Hemoglobin 12.6 (L) 13.3 - 17.7 g/dL    Hematocrit 37.9 (L) 40.0 - 53.0 %    MCV 96 78 - 100 fl    MCH 31.8 26.5 - 33.0 pg    MCHC 33.2 31.5 - 36.5 g/dL    RDW 13.9 10.0 - 15.0 %    Platelet Count 252 150 - 450 10e9/L    Diff Method Automated Method     % Neutrophils 67.2 %    % Lymphocytes 20.6 %    % Monocytes 7.2 %    % Eosinophils 3.8 %    % Basophils 0.9 %    % Immature Granulocytes 0.3 %    Nucleated RBCs 0 0 /100    Absolute Neutrophil 4.4 1.6 - 8.3 10e9/L    Absolute Lymphocytes 1.4 0.8 - 5.3 10e9/L    Absolute Monocytes 0.5 0.0 - 1.3 10e9/L    Absolute Eosinophils 0.3 0.0 - 0.7 10e9/L    Absolute Basophils 0.1 0.0 - 0.2 10e9/L    Abs Immature Granulocytes 0.0 0 - 0.4 10e9/L    Absolute Nucleated RBC 0.0    PSA, screen   Result Value Ref Range    PSA 2.32 0 - 4 ug/L      Comment:      Assay Method:  Chemiluminescence using Siemens Vista analyzer       If you have any questions or concerns, please call the clinic at the number listed above.       Sincerely,      Rio Jose, CNP

## 2021-04-12 NOTE — TELEPHONE ENCOUNTER
----- Message from Rio Jose NP sent at 4/12/2021  3:56 PM CDT -----  Please call and let him know the Prostate Specific Antigen test is normal, this tests screens for prostate cancer so a normal level is good. The hemoglobin is stable and as expected, all other lab counts stable as well. This is all good.    Rio Jose CNP

## 2021-04-13 NOTE — TELEPHONE ENCOUNTER
Left message for patient to call back. Letter printed and mailed to home address.     Sneha Tran on 4/13/2021 at 11:06 AM

## 2021-04-21 ENCOUNTER — TELEPHONE (OUTPATIENT)
Dept: FAMILY MEDICINE | Facility: CLINIC | Age: 51
End: 2021-04-21

## 2021-04-21 DIAGNOSIS — Z11.59 ENCOUNTER FOR SCREENING FOR OTHER VIRAL DISEASES: ICD-10-CM

## 2021-04-21 NOTE — TELEPHONE ENCOUNTER
Patient called looking for why we called him a while back. Reminded him about covid testing that is scheduled and colonoscopy.    Mireya Daly RN

## 2021-04-27 RX ORDER — BISACODYL 5 MG/1
15 TABLET, DELAYED RELEASE ORAL SEE ADMIN INSTRUCTIONS
Qty: 3 TABLET | Refills: 0 | Status: SHIPPED | OUTPATIENT
Start: 2021-04-27 | End: 2021-11-17

## 2021-04-27 RX ORDER — SODIUM, POTASSIUM,MAG SULFATES 17.5-3.13G
1 SOLUTION, RECONSTITUTED, ORAL ORAL SEE ADMIN INSTRUCTIONS
Qty: 1 ML | Refills: 0 | Status: SHIPPED | OUTPATIENT
Start: 2021-04-27 | End: 2021-11-17

## 2021-04-30 DIAGNOSIS — Z11.59 ENCOUNTER FOR SCREENING FOR OTHER VIRAL DISEASES: ICD-10-CM

## 2021-04-30 LAB
LABORATORY COMMENT REPORT: NORMAL
SARS-COV-2 RNA RESP QL NAA+PROBE: NEGATIVE
SARS-COV-2 RNA RESP QL NAA+PROBE: NORMAL
SPECIMEN SOURCE: NORMAL
SPECIMEN SOURCE: NORMAL

## 2021-04-30 PROCEDURE — U0003 INFECTIOUS AGENT DETECTION BY NUCLEIC ACID (DNA OR RNA); SEVERE ACUTE RESPIRATORY SYNDROME CORONAVIRUS 2 (SARS-COV-2) (CORONAVIRUS DISEASE [COVID-19]), AMPLIFIED PROBE TECHNIQUE, MAKING USE OF HIGH THROUGHPUT TECHNOLOGIES AS DESCRIBED BY CMS-2020-01-R: HCPCS | Performed by: INTERNAL MEDICINE

## 2021-04-30 PROCEDURE — U0005 INFEC AGEN DETEC AMPLI PROBE: HCPCS | Performed by: INTERNAL MEDICINE

## 2021-05-04 ENCOUNTER — HOSPITAL ENCOUNTER (OUTPATIENT)
Facility: AMBULATORY SURGERY CENTER | Age: 51
Discharge: HOME | End: 2021-05-04
Attending: INTERNAL MEDICINE | Admitting: INTERNAL MEDICINE
Payer: MEDICAID

## 2021-05-04 VITALS
TEMPERATURE: 97.1 F | HEART RATE: 65 BPM | RESPIRATION RATE: 16 BRPM | OXYGEN SATURATION: 97 % | SYSTOLIC BLOOD PRESSURE: 114 MMHG | DIASTOLIC BLOOD PRESSURE: 76 MMHG

## 2021-05-04 DIAGNOSIS — Z12.11 SPECIAL SCREENING FOR MALIGNANT NEOPLASMS, COLON: Primary | ICD-10-CM

## 2021-05-04 LAB
COLONOSCOPY: NORMAL
UPPER GI ENDOSCOPY: NORMAL

## 2021-05-04 PROCEDURE — 88305 TISSUE EXAM BY PATHOLOGIST: CPT | Mod: GC | Performed by: PATHOLOGY

## 2021-05-04 PROCEDURE — 43239 EGD BIOPSY SINGLE/MULTIPLE: CPT

## 2021-05-04 PROCEDURE — G8918 PT W/O PREOP ORDER IV AB PRO: HCPCS

## 2021-05-04 PROCEDURE — 45380 COLONOSCOPY AND BIOPSY: CPT | Mod: XS

## 2021-05-04 PROCEDURE — G8907 PT DOC NO EVENTS ON DISCHARG: HCPCS

## 2021-05-04 PROCEDURE — 45385 COLONOSCOPY W/LESION REMOVAL: CPT

## 2021-05-04 RX ORDER — NALOXONE HYDROCHLORIDE 0.4 MG/ML
0.2 INJECTION, SOLUTION INTRAMUSCULAR; INTRAVENOUS; SUBCUTANEOUS
Status: DISCONTINUED | OUTPATIENT
Start: 2021-05-04 | End: 2021-05-05 | Stop reason: HOSPADM

## 2021-05-04 RX ORDER — NALOXONE HYDROCHLORIDE 0.4 MG/ML
0.4 INJECTION, SOLUTION INTRAMUSCULAR; INTRAVENOUS; SUBCUTANEOUS
Status: DISCONTINUED | OUTPATIENT
Start: 2021-05-04 | End: 2021-05-05 | Stop reason: HOSPADM

## 2021-05-04 RX ORDER — FLUMAZENIL 0.1 MG/ML
0.2 INJECTION, SOLUTION INTRAVENOUS
Status: DISCONTINUED | OUTPATIENT
Start: 2021-05-04 | End: 2021-05-05 | Stop reason: HOSPADM

## 2021-05-04 RX ORDER — PROCHLORPERAZINE MALEATE 10 MG
10 TABLET ORAL EVERY 6 HOURS PRN
Status: DISCONTINUED | OUTPATIENT
Start: 2021-05-04 | End: 2021-05-05 | Stop reason: HOSPADM

## 2021-05-04 RX ORDER — FENTANYL CITRATE 50 UG/ML
INJECTION, SOLUTION INTRAMUSCULAR; INTRAVENOUS PRN
Status: DISCONTINUED | OUTPATIENT
Start: 2021-05-04 | End: 2021-05-04 | Stop reason: HOSPADM

## 2021-05-04 RX ORDER — ONDANSETRON 4 MG/1
4 TABLET, ORALLY DISINTEGRATING ORAL EVERY 6 HOURS PRN
Status: DISCONTINUED | OUTPATIENT
Start: 2021-05-04 | End: 2021-05-05 | Stop reason: HOSPADM

## 2021-05-04 RX ORDER — LIDOCAINE 40 MG/G
CREAM TOPICAL
Status: DISCONTINUED | OUTPATIENT
Start: 2021-05-04 | End: 2021-05-05 | Stop reason: HOSPADM

## 2021-05-04 RX ORDER — LIDOCAINE HYDROCHLORIDE 20 MG/ML
JELLY TOPICAL PRN
Status: DISCONTINUED | OUTPATIENT
Start: 2021-05-04 | End: 2021-05-04 | Stop reason: HOSPADM

## 2021-05-04 RX ORDER — ONDANSETRON 2 MG/ML
4 INJECTION INTRAMUSCULAR; INTRAVENOUS
Status: DISCONTINUED | OUTPATIENT
Start: 2021-05-04 | End: 2021-05-05 | Stop reason: HOSPADM

## 2021-05-04 RX ORDER — ONDANSETRON 2 MG/ML
4 INJECTION INTRAMUSCULAR; INTRAVENOUS EVERY 6 HOURS PRN
Status: DISCONTINUED | OUTPATIENT
Start: 2021-05-04 | End: 2021-05-05 | Stop reason: HOSPADM

## 2021-05-06 LAB — COPATH REPORT: NORMAL

## 2021-06-02 ENCOUNTER — MEDICAL CORRESPONDENCE (OUTPATIENT)
Dept: HEALTH INFORMATION MANAGEMENT | Facility: CLINIC | Age: 51
End: 2021-06-02

## 2021-06-02 ENCOUNTER — VIRTUAL VISIT (OUTPATIENT)
Dept: GASTROENTEROLOGY | Facility: CLINIC | Age: 51
End: 2021-06-02
Payer: COMMERCIAL

## 2021-06-02 DIAGNOSIS — R93.3 ABNORMAL COMPUTED TOMOGRAPHY OF GASTROINTESTINAL TRACT: Primary | ICD-10-CM

## 2021-06-02 DIAGNOSIS — R63.4 WEIGHT LOSS: ICD-10-CM

## 2021-06-02 DIAGNOSIS — K62.5 RECTAL BLEEDING: ICD-10-CM

## 2021-06-02 DIAGNOSIS — R10.84 ABDOMINAL PAIN, GENERALIZED: ICD-10-CM

## 2021-06-02 DIAGNOSIS — R14.0 BLOATING: ICD-10-CM

## 2021-06-02 PROCEDURE — 99214 OFFICE O/P EST MOD 30 MIN: CPT | Mod: 95 | Performed by: INTERNAL MEDICINE

## 2021-06-02 RX ORDER — ACETAMINOPHEN 325 MG/1
325-650 TABLET ORAL EVERY 6 HOURS PRN
COMMUNITY
End: 2021-11-17

## 2021-06-02 NOTE — PROGRESS NOTES
Misha is a 51 year old who is being evaluated via a billable video visit.      How would you like to obtain your AVS? Mail a copy  If the video visit is dropped, the invitation should be resent by: Text to cell phone: [phone]   Will anyone else be joining your video visit? No      Video Start Time:   Video-Visit Details    Type of service:  Video Visit    Video End Time:    Originating Location (pt. Location):     Distant Location (provider location):  [facility]     Platform used for Video Visit:   Solo Guevara CMA

## 2021-06-02 NOTE — PATIENT INSTRUCTIONS
Please make an appointment to be seen by the pelvic floor clinic.    Start taking miralax daily.    Continue your omeprazole - take it on an empty stomach and eat 30 to 60 minutes later.    Please have blood work and an ultrasound done.

## 2021-06-03 ENCOUNTER — TELEPHONE (OUTPATIENT)
Dept: GASTROENTEROLOGY | Facility: CLINIC | Age: 51
End: 2021-06-03

## 2021-06-03 ENCOUNTER — DOCUMENTATION ONLY (OUTPATIENT)
Dept: GASTROENTEROLOGY | Facility: CLINIC | Age: 51
End: 2021-06-03

## 2021-06-03 NOTE — PROGRESS NOTES
LPN faxed pelvic floor referral to the [facility] at [phone] per Dr. Bush's request.     Zully Bar LPN

## 2021-06-03 NOTE — TELEPHONE ENCOUNTER
1st attempt.    Left voicemail to schedule the followin. Lab work  2. Ultrasound of Abdomen  3. Follow up with Dr. Bush in 3 months (around 21)    Nayeli Landon  Medical Specialty Procedure   [facility] [place]

## 2021-06-08 DIAGNOSIS — R10.84 ABDOMINAL PAIN, GENERALIZED: ICD-10-CM

## 2021-06-08 DIAGNOSIS — K62.5 RECTAL BLEEDING: ICD-10-CM

## 2021-06-08 DIAGNOSIS — R14.0 BLOATING: ICD-10-CM

## 2021-06-08 DIAGNOSIS — R63.4 WEIGHT LOSS: ICD-10-CM

## 2021-06-08 DIAGNOSIS — R93.3 ABNORMAL COMPUTED TOMOGRAPHY OF GASTROINTESTINAL TRACT: ICD-10-CM

## 2021-06-08 LAB
ALBUMIN SERPL-MCNC: 3.6 G/DL (ref 3.4–5)
ALP SERPL-CCNC: 93 U/L (ref 40–150)
ALT SERPL W P-5'-P-CCNC: 21 U/L (ref 0–70)
ANION GAP SERPL CALCULATED.3IONS-SCNC: 4 MMOL/L (ref 3–14)
AST SERPL W P-5'-P-CCNC: 18 U/L (ref 0–45)
BILIRUB SERPL-MCNC: 0.4 MG/DL (ref 0.2–1.3)
BUN SERPL-MCNC: 18 MG/DL (ref 7–30)
CALCIUM SERPL-MCNC: 8.4 MG/DL (ref 8.5–10.1)
CHLORIDE SERPL-SCNC: 108 MMOL/L (ref 94–109)
CO2 SERPL-SCNC: 28 MMOL/L (ref 20–32)
CREAT SERPL-MCNC: 1 MG/DL (ref 0.66–1.25)
ERYTHROCYTE [DISTWIDTH] IN BLOOD BY AUTOMATED COUNT: 13.4 % (ref 10–15)
GFR SERPL CREATININE-BSD FRML MDRD: 85 ML/MIN/{1.73_M2}
GLUCOSE SERPL-MCNC: 81 MG/DL (ref 70–99)
HCT VFR BLD AUTO: 39.6 % (ref 40–53)
HGB BLD-MCNC: 13.1 G/DL (ref 13.3–17.7)
INR PPP: 0.91 (ref 0.86–1.14)
MCH RBC QN AUTO: 31.6 PG (ref 26.5–33)
MCHC RBC AUTO-ENTMCNC: 33.1 G/DL (ref 31.5–36.5)
MCV RBC AUTO: 96 FL (ref 78–100)
PLATELET # BLD AUTO: 235 10E9/L (ref 150–450)
POTASSIUM SERPL-SCNC: 4.7 MMOL/L (ref 3.4–5.3)
PROT SERPL-MCNC: 7 G/DL (ref 6.8–8.8)
RBC # BLD AUTO: 4.14 10E12/L (ref 4.4–5.9)
SODIUM SERPL-SCNC: 140 MMOL/L (ref 133–144)
WBC # BLD AUTO: 5.6 10E9/L (ref 4–11)

## 2021-06-08 PROCEDURE — 85610 PROTHROMBIN TIME: CPT | Performed by: INTERNAL MEDICINE

## 2021-06-08 PROCEDURE — 85027 COMPLETE CBC AUTOMATED: CPT | Performed by: INTERNAL MEDICINE

## 2021-06-08 PROCEDURE — 36415 COLL VENOUS BLD VENIPUNCTURE: CPT | Performed by: INTERNAL MEDICINE

## 2021-06-08 PROCEDURE — 80053 COMPREHEN METABOLIC PANEL: CPT | Performed by: INTERNAL MEDICINE

## 2021-06-11 ENCOUNTER — ANCILLARY PROCEDURE (OUTPATIENT)
Dept: ULTRASOUND IMAGING | Facility: CLINIC | Age: 51
End: 2021-06-11
Attending: INTERNAL MEDICINE
Payer: COMMERCIAL

## 2021-06-11 DIAGNOSIS — K62.5 RECTAL BLEEDING: ICD-10-CM

## 2021-06-11 DIAGNOSIS — R10.84 ABDOMINAL PAIN, GENERALIZED: ICD-10-CM

## 2021-06-11 DIAGNOSIS — R93.3 ABNORMAL COMPUTED TOMOGRAPHY OF GASTROINTESTINAL TRACT: ICD-10-CM

## 2021-06-11 DIAGNOSIS — R14.0 BLOATING: ICD-10-CM

## 2021-06-11 DIAGNOSIS — K76.9 LIVER LESION: Primary | ICD-10-CM

## 2021-06-11 DIAGNOSIS — R63.4 WEIGHT LOSS: ICD-10-CM

## 2021-06-11 LAB — RADIOLOGIST FLAGS: ABNORMAL

## 2021-06-11 PROCEDURE — 93975 VASCULAR STUDY: CPT | Performed by: RADIOLOGY

## 2021-06-11 PROCEDURE — 76700 US EXAM ABDOM COMPLETE: CPT | Mod: 59 | Performed by: RADIOLOGY

## 2021-06-21 ENCOUNTER — PATIENT OUTREACH (OUTPATIENT)
Dept: GASTROENTEROLOGY | Facility: CLINIC | Age: 51
End: 2021-06-21

## 2021-06-21 NOTE — PROGRESS NOTES
----- Message -----   From: Jesusita Bush DO   Sent: 6/11/2021  11:18 AM CDT   To: [facility]     He needs an MRI of his liver - I spoke to him so he is aware.  He also needs to see the pelvic floor clinic for rectal prolapse.  Can you help him arrange both?       - Spoke with patient on 6/18/2021, informed of provider advice.  Patient stated he is available anytime the week of 6/21/2021 for imaging and requested that an appointment be scheduled.   Per review of chart, patient is scheduled for MRI on 7/7/2021.  Referral, face sheet and office visit notes faxed to [facility], referral form sent for scanning.      Melania Lawson RN

## 2021-06-21 NOTE — PROGRESS NOTES
Detailed voice mail left for patient advising that referral has been faxed to [facility] and he should be contacted to schedule.  If he does not hear from them, contact number provided for the [facility].     Melania Lawson RN

## 2021-06-29 ENCOUNTER — MEDICAL CORRESPONDENCE (OUTPATIENT)
Dept: HEALTH INFORMATION MANAGEMENT | Facility: CLINIC | Age: 51
End: 2021-06-29

## 2021-07-07 ENCOUNTER — ANCILLARY PROCEDURE (OUTPATIENT)
Dept: MRI IMAGING | Facility: CLINIC | Age: 51
End: 2021-07-07
Attending: INTERNAL MEDICINE
Payer: COMMERCIAL

## 2021-07-07 DIAGNOSIS — K76.9 LIVER LESION: ICD-10-CM

## 2021-07-07 PROCEDURE — A9585 GADOBUTROL INJECTION: HCPCS | Mod: JW | Performed by: RADIOLOGY

## 2021-07-07 PROCEDURE — 74183 MRI ABD W/O CNTR FLWD CNTR: CPT | Mod: GC | Performed by: RADIOLOGY

## 2021-07-07 RX ORDER — GADOBUTROL 604.72 MG/ML
7.5 INJECTION INTRAVENOUS ONCE
Status: COMPLETED | OUTPATIENT
Start: 2021-07-07 | End: 2021-07-07

## 2021-07-07 RX ADMIN — GADOBUTROL 6.5 ML: 604.72 INJECTION INTRAVENOUS at 15:50

## 2021-07-10 ENCOUNTER — HEALTH MAINTENANCE LETTER (OUTPATIENT)
Age: 51
End: 2021-07-10

## 2021-08-29 ENCOUNTER — MYC REFILL (OUTPATIENT)
Dept: FAMILY MEDICINE | Facility: CLINIC | Age: 51
End: 2021-08-29

## 2021-08-29 DIAGNOSIS — R10.84 ABDOMINAL PAIN, GENERALIZED: ICD-10-CM

## 2021-08-29 DIAGNOSIS — R63.4 WEIGHT LOSS: ICD-10-CM

## 2021-08-29 DIAGNOSIS — K62.5 RECTAL BLEEDING: ICD-10-CM

## 2021-08-30 NOTE — TELEPHONE ENCOUNTER
Pending Prescriptions:                       Disp   Refills    omeprazole (PRILOSEC) 20 MG DR capsule     60 cap*0        Sig: Take 2 capsules (40 mg) by mouth daily    Routing refill request to provider for review/approval because:  A break in medication    Routing to team to set up virtual appointment for patient for establish care.

## 2021-09-04 ENCOUNTER — HEALTH MAINTENANCE LETTER (OUTPATIENT)
Age: 51
End: 2021-09-04

## 2021-09-07 ENCOUNTER — HOSPITAL ENCOUNTER (OUTPATIENT)
Dept: BEHAVIORAL HEALTH | Facility: CLINIC | Age: 51
Discharge: HOME | End: 2021-09-07
Attending: FAMILY MEDICINE | Admitting: FAMILY MEDICINE
Payer: COMMERCIAL

## 2021-09-07 ENCOUNTER — TELEPHONE (OUTPATIENT)
Dept: BEHAVIORAL HEALTH | Facility: CLINIC | Age: 51
End: 2021-09-07

## 2021-09-07 DIAGNOSIS — F41.9 ANXIETY DISORDER, UNSPECIFIED TYPE: ICD-10-CM

## 2021-09-07 PROCEDURE — 90791 PSYCH DIAGNOSTIC EVALUATION: CPT | Mod: TEL | Performed by: COUNSELOR

## 2021-09-07 ASSESSMENT — COLUMBIA-SUICIDE SEVERITY RATING SCALE - C-SSRS
4. HAVE YOU HAD THESE THOUGHTS AND HAD SOME INTENTION OF ACTING ON THEM?: NO
2. HAVE YOU ACTUALLY HAD ANY THOUGHTS OF KILLING YOURSELF LIFETIME?: YES
2. HAVE YOU ACTUALLY HAD ANY THOUGHTS OF KILLING YOURSELF?: YES
1. IN THE PAST MONTH, HAVE YOU WISHED YOU WERE DEAD OR WISHED YOU COULD GO TO SLEEP AND NOT WAKE UP?: YES
TOTAL  NUMBER OF ACTUAL ATTEMPTS LIFETIME: 2
5. HAVE YOU STARTED TO WORK OUT OR WORKED OUT THE DETAILS OF HOW TO KILL YOURSELF? DO YOU INTEND TO CARRY OUT THIS PLAN?: YES
TOTAL  NUMBER OF INTERRUPTED ATTEMPTS PAST 3 MONTHS: NO
6. HAVE YOU EVER DONE ANYTHING, STARTED TO DO ANYTHING, OR PREPARED TO DO ANYTHING TO END YOUR LIFE?: NO
ATTEMPT PAST THREE MONTHS: NO
6. HAVE YOU EVER DONE ANYTHING, STARTED TO DO ANYTHING, OR PREPARED TO DO ANYTHING TO END YOUR LIFE?: YES
TOTAL  NUMBER OF ABORTED OR SELF INTERRUPTED ATTEMPTS PAST LIFETIME: YES
ATTEMPT LIFETIME: YES
3. HAVE YOU BEEN THINKING ABOUT HOW YOU MIGHT KILL YOURSELF?: YES
TOTAL  NUMBER OF INTERRUPTED ATTEMPTS LIFETIME: YES
1. IN THE PAST MONTH, HAVE YOU WISHED YOU WERE DEAD OR WISHED YOU COULD GO TO SLEEP AND NOT WAKE UP?: YES
TOTAL  NUMBER OF ABORTED OR SELF INTERRUPTED ATTEMPTS PAST 3 MONTHS: NO
REASONS FOR IDEATION LIFETIME: MOSTLY TO END OR STOP THE PAIN (YOU COULDN'T GO ON LIVING WITH THE PAIN OR HOW YOU WERE FEELING)
REASONS FOR IDEATION PAST MONTH: MOSTLY TO END OR STOP THE PAIN (YOU COULDN'T GO ON LIVING WITH THE PAIN OR HOW YOU WERE FEELING)
4. HAVE YOU HAD THESE THOUGHTS AND HAD SOME INTENTION OF ACTING ON THEM?: YES
TOTAL  NUMBER OF INTERRUPTED ATTEMPTS PAST 3 MONTHS: 1
5. HAVE YOU STARTED TO WORK OUT OR WORKED OUT THE DETAILS OF HOW TO KILL YOURSELF? DO YOU INTEND TO CARRY OUT THIS PLAN?: NO
TOTAL  NUMBER OF ABORTED OR SELF INTERRUPTED ATTEMPTS LIFETIME: 1

## 2021-09-07 ASSESSMENT — ANXIETY QUESTIONNAIRES
7. FEELING AFRAID AS IF SOMETHING AWFUL MIGHT HAPPEN: MORE THAN HALF THE DAYS
GAD7 TOTAL SCORE: 19
2. NOT BEING ABLE TO STOP OR CONTROL WORRYING: NEARLY EVERY DAY
1. FEELING NERVOUS, ANXIOUS, OR ON EDGE: NEARLY EVERY DAY
IF YOU CHECKED OFF ANY PROBLEMS ON THIS QUESTIONNAIRE, HOW DIFFICULT HAVE THESE PROBLEMS MADE IT FOR YOU TO DO YOUR WORK, TAKE CARE OF THINGS AT HOME, OR GET ALONG WITH OTHER PEOPLE: VERY DIFFICULT
5. BEING SO RESTLESS THAT IT IS HARD TO SIT STILL: NEARLY EVERY DAY
6. BECOMING EASILY ANNOYED OR IRRITABLE: MORE THAN HALF THE DAYS
3. WORRYING TOO MUCH ABOUT DIFFERENT THINGS: NEARLY EVERY DAY

## 2021-09-07 ASSESSMENT — PATIENT HEALTH QUESTIONNAIRE - PHQ9
5. POOR APPETITE OR OVEREATING: NEARLY EVERY DAY
SUM OF ALL RESPONSES TO PHQ QUESTIONS 1-9: 15

## 2021-09-07 NOTE — PATIENT INSTRUCTIONS
Misha,  Below are the appointment details for the appointments we got scheduled. I also put resources below. If you have any questions or want more resources feel free to reach out.     PSYCHIATRY APPOINTMENT SCHEDULED  Date: Thursday, 9/9/2021  Time: 2:00 pm - 3:00 pm  Provider: Cielo BALC  Location: [facility], [address], [address], [place] [address]  Phone: [phone]  Type: Telepsychiatry    THERAPY APPOINTMENT SCHEDULED  Date: Monday, 9/13/2021  Time: 9:00 am - 10:00 am  Provider: Micheal Khan PhD  LP  Location: [facility], [address], [address], [place] [address]  Phone: [phone]  Type: Teletherapy  Patient Instructions:Please fill New Patient Form by using following link. All forms need to be completed 24 hours prior to the appointment date/time by going to [email or web address] Please call us on [phone] 24 hours prior to your scheduled appointment to confirm that you are able to attend. We will provide you information about how to log into video call software when you call.    RESOURCES  NA Meeting resources  [email or web address]/na_meetings/[place]/coon-rapids/  https://www.na.org/meetingsearch/text-results.php?country=[place]&state=[place]&city=[place]&zip=&street=&within=20&day=0&lang=  https://meetings.i.[employer]/meetings/search?latitude=45.923954&longitude=-93.10284&kqdrlplbu=880&page=7&page_city=[place]&page_state=[place]&results_per_page=25&page=5    Mental health and crisis resources  [facility] (free short term therapy)  Website:https://walkin.org/  : Professionals or agencies I can contact during a crisis:     ? Suicide Prevention Lifeline: 8-307-537-TALK (9798)  ? Crisis Text Line Service (available 24 hours a day, 7 days a week): Text [place] to 002462  ? Call  **CRISIS (388651) from a cell phone to talk to a team of professionals who can help you.          Crisis Services By  County: Phone Number:   [place]     [phone]   [place]    [phone]   [place]    [phone]   [place]    [phone]   [place]    [phone]   [place] [phone]   [place]     [phone]      ? Call 911 or go to my nearest emergency department.         Barbara Hatfield, [facility], [facility]  Licensed Psychotherapist   [facility]  Mental Health and Addiction Services [email or web address]  [email or web address]  Office: [phone]  Fax: [phone]  Gender pronouns: she/her/hers

## 2021-09-07 NOTE — TELEPHONE ENCOUNTER
Tried reaching out to patient to check them in for their virtual MH eval today, 9/7/2021 at 0800. Called, but no answer so a voice message was left for patient to return call to check in.

## 2021-09-07 NOTE — TELEPHONE ENCOUNTER
A second phone call was made out to patient to check them in for their virtual mh eval today, but no answer so another voice message was left for patient to return call.

## 2021-09-07 NOTE — PROGRESS NOTES
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 markedly increased amounts of the substance to achieve the desired effect or a markedly diminished effect with continued use of the dame amount of the substance.  Met for:  Alcohol and Amphetamines, Withdrawal:  either patient endorses characteristic withdrawal syndrome for the substance or the substance (or closely related substance) is taken to relieve or avoid withdrawal symptoms.  Met for:  Alcohol and Amphetamines  Unspecified Schizophrenia Spectrum and Other Psychotic Disorder, This category applies to presentation in which symptoms characteristic of schizophrenia spectrum and other psychotic disorder that cause clinically significant distress or impairment in social, occupational, or other important areas of functioning predominate but do not meet the full criteria for any of the disorders in schizophrenia spectrum and other psychotic disorder category is used in situations in which the clinician chooses not to specify he reason that the criteria are not met for a specific schizophrenia spectrum and other psychotic disorder, and includes presentations in which there is insufficient information to make a more specific diagnosis.    Functional Status:  Patient reports the following functional  impairments: relationship(s), self-care, social interactions and work / vocational responsibilities.     WHODAS:   WHODAS 2.0 Total Score 9/7/2021   Total Score 19     Nonprogrammatic care:  Patient is requesting basic services to address current mental health concerns.    Clinical Summary:  1. Reason for assessment: seeking recommendations.  2. Psychosocial, Cultural and Contextual Factors: patient reports, lack of family and social support, unemployed and looking for employment, unstable housing, financial concerns.  3. 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 Other Diagnoses that is relevant to services:   5. Provisional Diagnosis:  6. Prognosis: Expect Improvement, Relieve Acute Symptoms and Maintain Current Status / Prevent Deterioration.  7. Likely consequences of symptoms if not treated: If untreated, patient's mental health will likely deteriorate and may require a higher level of care.  8. Client strengths include:  work history .     Recommendations:     1. Plan for Safety and Risk Management:   A safety and risk management plan has been developed including: Patient consented to co-developed safety plan.  Safety and risk management plan was completed.  Patient agreed to use safety plan should any safety concerns arise.  A copy was given to the patient..          Report to child / adult protection services was NA.     2. Patient did not identify Latter day, ethnic or cultural issues relevant to therapy at this time.Patient encouraged to ask for help should needs arise in the course of treatment.      3. Initial Treatment will focus on:     Risk Management / Safety Concerns related to: Suicidal ideation  Alcohol / Substance Use - recovery skills  Thought Disturbance including: hallucinations.     4. Resources/Service Plan:    services are not indicated.   Modifications to assist communication are not indicated.   Additional disability accommodations are not indicated.      5. Collaboration:   Collaboration / coordination of treatment will be initiated with the following support professionals: A verbal Release of Information has been obtained for: emergency contact, brother York, [phone].     6.  Referrals:   The following referral(s) will be initiated: [facility]  Psychiatry. Next Scheduled Appointment: see below. Patient was recommended co-occurring residential or inpatient treatment but patient reported he would like referrals only to psychiatry and therapy at this time. Patient reported he would consider IOP treatment but needs to find a job and make working a priority for his financial concern.    PSYCHIATRY APPOINTMENT SCHEDULED  Date: Thursday, 9/9/2021  Time: 2:00 pm - 3:00 pm  Provider: Cielo Pritchard MA, PA-C  Location: [facility], [address], [address], [place] [address]  Phone: [phone]  Type: Telepsychiatry     THERAPY APPOINTMENT SCHEDULED  Date: Monday, 9/13/2021  Time: 9:00 am - 10:00 am  Provider: Micheal Khan PhD    Location: [facility], [address], [address], [place] [address]  Phone: [phone]  Type: Teletherapy      7. KENDALL:    Recommendations:  Patient was recommended co-occurring residential or inpatient treatment but patient reported he would like referrals only to psychiatry and therapy at this time. Patient reported he would consider IOP treatment but needs to find a job and make working a priority for his financial concern. Abstain from using all non-prescribed mood altering chemicals and  "substances. Attend sober support group meetings 3-4x per week. Obtain male sponsor.   Patient would like the following family or other support people involved in their treatment:  Only emergency contacts brother. Patient does not have a history of opiate use.    8. Records:   These were reviewed at time of assessment.   Information in this assessment was obtained from the medical record and  provided by patient who is a fair historian.    Patient will have open access to their mental health medical record.        Provider Name/ Credentials:  Barbara Snow, [facility], [facility]  September 7, 2021                Outpatient Mental Health Services - Adult    MY COPING PLAN FOR SAFETY    PATIENT'S NAME: Misha Nicole  MRN:   [id number]    SAFETY PLAN:    Step 1: Warning signs / cues (Thoughts, images, mood, situation, behavior) that a crisis may be developing:      Thoughts: \"I can't do this anymore\" and \"I just want this to end\"    Images: none identified    Thinking Processes:ruminations (can't stop thinking about my problems), intrusive thoughts (bothersome, unwanted thoughts that come out of nowhere), and paranoia.     Mood: worsening depression, hopelessness, helplessness, intense anger, intense worry and mood swings    Behaviors: using drugs, using alcohol, not taking care of myself and not taking care of my responsibilities    Situations: pain, relationship problems, relapse and financial stress       Step 2: Coping strategies - Things I can do to take my mind off of my problems without contacting another person (relaxation technique, physical activity):      Distress Tolerance Strategies:  painting,drawing, music    Physical Activities: none identified    Focus on helpful thoughts:  remind myself of what is important to me: daughter and grandchildren    Step 3: People and social settings that provide distraction:     Name: Pedro Yancey Phone: [phone]   Name: Daughter Phone:    none identified     Step 4: " Remind myself of people and things that are important to me and worth living for:  Daughter and grandchildren    Step 5: When I am in crisis, I can ask these people to help me use my safety plan:     Name: Pedro Yancey Phone: [phone]   Name: Daughter Phone:    Step 6: Making the environment safe:       remove alcohol, remove drugs and be around others    Step 7: Professionals or agencies I can contact during a crisis:      Suicide Prevention Lifeline: 1-631-972-ABKE (4512)    Crisis Text Line Service (available 24 hours a day, 7 days a week): Text [place] to 582448    Call  **CRISIS (107226) from a cell phone to talk to a team of professionals who can help you.    Crisis Services By [facility]: Phone Number:   [place]     [phone]   [place]    [phone]   [place]    [phone]   [place]    [phone]   [place]    [phone]   [place] [phone]   [place]     [phone]       Call 911 or go to my nearest emergency department.     I helped develop this safety plan and agree to use it when needed.  I have been given a copy of this plan.        Today s date:  9/7/2021  Adapted from Safety Plan Template 2008 Aida Guzman and Dung Segal is reprinted with the express permission of the authors.  No portion of the Safety Plan Template may be reproduced without the express, written permission.  You can contact the authors at [email or web address] or [email or web address]

## 2021-09-08 ASSESSMENT — ANXIETY QUESTIONNAIRES: GAD7 TOTAL SCORE: 19

## 2021-11-17 ENCOUNTER — HOSPITAL ENCOUNTER (EMERGENCY)
Facility: CLINIC | Age: 51
Discharge: HOME | End: 2021-11-17
Attending: EMERGENCY MEDICINE | Admitting: EMERGENCY MEDICINE
Payer: COMMERCIAL

## 2021-11-17 ENCOUNTER — APPOINTMENT (OUTPATIENT)
Dept: GENERAL RADIOLOGY | Facility: CLINIC | Age: 51
End: 2021-11-17
Attending: EMERGENCY MEDICINE
Payer: COMMERCIAL

## 2021-11-17 VITALS
TEMPERATURE: 97.9 F | RESPIRATION RATE: 18 BRPM | WEIGHT: 144.2 LBS | SYSTOLIC BLOOD PRESSURE: 111 MMHG | BODY MASS INDEX: 22.86 KG/M2 | HEART RATE: 88 BPM | DIASTOLIC BLOOD PRESSURE: 85 MMHG | OXYGEN SATURATION: 98 %

## 2021-11-17 DIAGNOSIS — J44.1 COPD EXACERBATION (H): ICD-10-CM

## 2021-11-17 LAB
ALBUMIN SERPL-MCNC: 3.5 G/DL (ref 3.4–5)
ALP SERPL-CCNC: 111 U/L (ref 40–150)
ALT SERPL W P-5'-P-CCNC: 25 U/L (ref 0–70)
ANION GAP SERPL CALCULATED.3IONS-SCNC: 5 MMOL/L (ref 3–14)
AST SERPL W P-5'-P-CCNC: 25 U/L (ref 0–45)
BASOPHILS # BLD AUTO: 0.1 10E3/UL (ref 0–0.2)
BASOPHILS NFR BLD AUTO: 1 %
BILIRUB SERPL-MCNC: 0.3 MG/DL (ref 0.2–1.3)
BUN SERPL-MCNC: 40 MG/DL (ref 7–30)
CALCIUM SERPL-MCNC: 8.4 MG/DL (ref 8.5–10.1)
CHLORIDE BLD-SCNC: 105 MMOL/L (ref 94–109)
CO2 SERPL-SCNC: 26 MMOL/L (ref 20–32)
CREAT SERPL-MCNC: 1.01 MG/DL (ref 0.66–1.25)
EOSINOPHIL # BLD AUTO: 0.4 10E3/UL (ref 0–0.7)
EOSINOPHIL NFR BLD AUTO: 6 %
ERYTHROCYTE [DISTWIDTH] IN BLOOD BY AUTOMATED COUNT: 13.2 % (ref 10–15)
FLUAV RNA SPEC QL NAA+PROBE: NEGATIVE
FLUBV RNA RESP QL NAA+PROBE: NEGATIVE
GFR SERPL CREATININE-BSD FRML MDRD: 85 ML/MIN/1.73M2
GLUCOSE BLD-MCNC: 102 MG/DL (ref 70–99)
HCT VFR BLD AUTO: 38.8 % (ref 40–53)
HGB BLD-MCNC: 13 G/DL (ref 13.3–17.7)
IMM GRANULOCYTES # BLD: 0 10E3/UL
IMM GRANULOCYTES NFR BLD: 0 %
LYMPHOCYTES # BLD AUTO: 1.9 10E3/UL (ref 0.8–5.3)
LYMPHOCYTES NFR BLD AUTO: 29 %
MCH RBC QN AUTO: 31.8 PG (ref 26.5–33)
MCHC RBC AUTO-ENTMCNC: 33.5 G/DL (ref 31.5–36.5)
MCV RBC AUTO: 95 FL (ref 78–100)
MONOCYTES # BLD AUTO: 0.6 10E3/UL (ref 0–1.3)
MONOCYTES NFR BLD AUTO: 8 %
NEUTROPHILS # BLD AUTO: 3.7 10E3/UL (ref 1.6–8.3)
NEUTROPHILS NFR BLD AUTO: 56 %
NRBC # BLD AUTO: 0 10E3/UL
NRBC BLD AUTO-RTO: 0 /100
PLATELET # BLD AUTO: 258 10E3/UL (ref 150–450)
POTASSIUM BLD-SCNC: 4.6 MMOL/L (ref 3.4–5.3)
PROT SERPL-MCNC: 7.4 G/DL (ref 6.8–8.8)
RBC # BLD AUTO: 4.09 10E6/UL (ref 4.4–5.9)
SARS-COV-2 RNA RESP QL NAA+PROBE: NEGATIVE
SODIUM SERPL-SCNC: 136 MMOL/L (ref 133–144)
WBC # BLD AUTO: 6.7 10E3/UL (ref 4–11)

## 2021-11-17 PROCEDURE — 258N000003 HC RX IP 258 OP 636: Performed by: EMERGENCY MEDICINE

## 2021-11-17 PROCEDURE — 99284 EMERGENCY DEPT VISIT MOD MDM: CPT | Mod: 25 | Performed by: EMERGENCY MEDICINE

## 2021-11-17 PROCEDURE — 71045 X-RAY EXAM CHEST 1 VIEW: CPT

## 2021-11-17 PROCEDURE — 87636 SARSCOV2 & INF A&B AMP PRB: CPT | Performed by: EMERGENCY MEDICINE

## 2021-11-17 PROCEDURE — 96374 THER/PROPH/DIAG INJ IV PUSH: CPT | Performed by: EMERGENCY MEDICINE

## 2021-11-17 PROCEDURE — 99284 EMERGENCY DEPT VISIT MOD MDM: CPT | Performed by: EMERGENCY MEDICINE

## 2021-11-17 PROCEDURE — 85025 COMPLETE CBC W/AUTO DIFF WBC: CPT | Performed by: EMERGENCY MEDICINE

## 2021-11-17 PROCEDURE — 36415 COLL VENOUS BLD VENIPUNCTURE: CPT | Performed by: EMERGENCY MEDICINE

## 2021-11-17 PROCEDURE — 250N000011 HC RX IP 250 OP 636: Performed by: EMERGENCY MEDICINE

## 2021-11-17 PROCEDURE — 96361 HYDRATE IV INFUSION ADD-ON: CPT | Performed by: EMERGENCY MEDICINE

## 2021-11-17 PROCEDURE — C9803 HOPD COVID-19 SPEC COLLECT: HCPCS | Performed by: EMERGENCY MEDICINE

## 2021-11-17 PROCEDURE — 80053 COMPREHEN METABOLIC PANEL: CPT | Performed by: EMERGENCY MEDICINE

## 2021-11-17 RX ORDER — ALBUTEROL SULFATE 90 UG/1
2 AEROSOL, METERED RESPIRATORY (INHALATION)
Qty: 6.7 G | Refills: 0 | Status: SHIPPED | OUTPATIENT
Start: 2021-11-17

## 2021-11-17 RX ORDER — LEVOFLOXACIN 750 MG
750 TABLET ORAL DAILY
Qty: 10 TABLET | Refills: 0 | Status: SHIPPED | OUTPATIENT
Start: 2021-11-17 | End: 2021-11-27

## 2021-11-17 RX ORDER — PREDNISONE 20 MG/1
60 TABLET ORAL DAILY
Qty: 15 TABLET | Refills: 0 | Status: SHIPPED | OUTPATIENT
Start: 2021-11-17 | End: 2021-11-22

## 2021-11-17 RX ORDER — DEXAMETHASONE SODIUM PHOSPHATE 10 MG/ML
10 INJECTION, SOLUTION INTRAMUSCULAR; INTRAVENOUS ONCE
Status: COMPLETED | OUTPATIENT
Start: 2021-11-17 | End: 2021-11-17

## 2021-11-17 RX ADMIN — SODIUM CHLORIDE 1000 ML: 9 INJECTION, SOLUTION INTRAVENOUS at 15:24

## 2021-11-17 RX ADMIN — DEXAMETHASONE SODIUM PHOSPHATE 10 MG: 10 INJECTION INTRAMUSCULAR; INTRAVENOUS at 15:24

## 2021-11-17 NOTE — ED PROVIDER NOTES
History     Chief Complaint   Patient presents with     Covid Concern     body aches , shortness of breath     HPI  Misha Nicole is a 51 year old male who presents with a fever and cough.  This is his 10th day of symptoms.  He has a history reported of chronic bronchitis and is a longtime tobacco user.  Fever to 101.  He has an inhaler but not been using this.  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    Past Surgical History:    Past Surgical History:   Procedure Laterality Date     COLONOSCOPY N/A 5/7/2018    Procedure: COMBINED COLONOSCOPY, SINGLE OR MULTIPLE BIOPSY/POLYPECTOMY BY BIOPSY;  Colonoscopy with biopsy by biopsy forceps;  Surgeon: Nathaniel Cook DO;  Location:  GI     COLONOSCOPY N/A 5/4/2021    Procedure: Colonoscopy, With Polypectomy And Biopsy;  Surgeon: Jesusita Bush DO;  Location: [facility] OR     COLONOSCOPY WITH CO2 INSUFFLATION N/A 5/4/2021    Procedure: COLONOSCOPY, WITH CO2 INSUFFLATION;  Surgeon: Jesusita Bush DO;  Location: [facility] OR     COMBINED ESOPHAGOSCOPY, GASTROSCOPY, DUODENOSCOPY (EGD) WITH CO2 INSUFFLATION N/A 5/4/2021    Procedure: ESOPHAGOGASTRODUODENOSCOPY, WITH CO2 INSUFFLATION;  Surgeon: Jesusita Bush DO;  Location:  OR     ESOPHAGOSCOPY, GASTROSCOPY, DUODENOSCOPY (EGD), COMBINED N/A 5/4/2021    Procedure: Esophagogastroduodenoscopy, With Biopsy;  Surgeon: Jesusita Bush DO;  Location:  OR     ORTHOPEDIC SURGERY  1985 &1996     Right & Left knee replacements       Family History:    No family history on file.    Social History:  Marital Status:  Single [1]  Social History     Tobacco Use     Smoking status: Current Every Day Smoker     Packs/day: 0.30     Types: Cigarettes     Smokeless tobacco: Current User     Types: Chew   Substance Use Topics     Alcohol use: Yes     Drug use: No        Medications:    albuterol (PROAIR HFA) 108 (90 Base) MCG/ACT Inhaler  albuterol (PROAIR HFA/PROVENTIL HFA/VENTOLIN HFA) 108 (90 Base) MCG/ACT inhaler  ibuprofen (ADVIL/MOTRIN) 200 MG tablet  LEVAQUIN 750 MG tablet  omeprazole (PRILOSEC) 20 MG DR capsule  predniSONE (DELTASONE) 20 MG tablet  SUMAtriptan (IMITREX) 50 MG tablet          Review of Systems  All other systems are reviewed and are negative    Physical Exam   BP: 115/80  Pulse: 86  Temp: 97.9  F (36.6  C)  Resp: 20  Weight: 65.4 kg (144 lb 3.2 oz)  SpO2: 97 %      Physical Exam  Vitals and nursing note reviewed.   Constitutional:       General: He is not in acute distress.     Appearance: He is well-developed. He is ill-appearing. He is not toxic-appearing or diaphoretic.   HENT:      Head: Normocephalic and atraumatic.   Eyes:      General: No scleral icterus.        Right eye: No discharge.         Left eye: No discharge.      Conjunctiva/sclera: Conjunctivae normal.   Cardiovascular:      Rate and Rhythm: Normal rate and regular rhythm.      Heart sounds: Normal heart sounds. No murmur heard.      Pulmonary:      Effort: Pulmonary effort is normal. No respiratory distress.      Breath sounds: No stridor. Wheezing present. No rhonchi.   Abdominal:      Palpations: Abdomen is soft.      Tenderness: There is no abdominal tenderness.   Musculoskeletal:         General: Normal range of motion.      Cervical back: Normal range of motion and neck supple.   Skin:     General: Skin is warm and dry.      Coloration: Skin is not pale.      Findings: No erythema or rash.   Neurological:      Mental  Status: He is alert.      Cranial Nerves: No cranial nerve deficit.      Motor: No abnormal muscle tone.         ED Course        Procedures              Critical Care time:  none               Results for orders placed or performed during the hospital encounter of 11/17/21 (from the past 24 hour(s))   Symptomatic Influenza A/B & SARS-CoV2 (COVID-19) Virus PCR Multiplex Nasopharyngeal    Specimen: Nasopharyngeal; Swab   Result Value Ref Range    Influenza A target Negative Negative    Influenza B target Negative Negative    SARS CoV2 PCR Negative Negative    Narrative    Testing was performed using the fatuma SARS-CoV-2 & Influenza A/B Assay on the fatuma Lula System. This test should be ordered for the detection of SARS-CoV-2 and influenza viruses in individuals who meet clinical and/or epidemiological criteria. Test performance is unknown in asymptomatic patients. This test is for in vitro diagnostic use under the FDA EUA for laboratories certified under CLIA to perform moderate and/or high complexity testing. This test has not been FDA cleared or approved. A negative result does not rule out the presence of PCR inhibitors in the specimen or target RNA in concentration below the limit of detection for the assay. If only one viral target is positive but coinfection with multiple targets is suspected, the sample should be re-tested with another FDA cleared, approved or authorized test, if coinfection would change clinical management. [facility] are certified under the Clinical Laboratory Improvement Amendments of 1988 (CLIA-88) as  qualified to perform moderate and/or high complexity laboratory testing.   CBC with platelets differential    Narrative    The following orders were created for panel order CBC with platelets differential.  Procedure                               Abnormality         Status                     ---------                               -----------         ------                      CBC with platelets and d...[[id number]]  Abnormal            Final result                 Please view results for these tests on the individual orders.   Comprehensive metabolic panel   Result Value Ref Range    Sodium 136 133 - 144 mmol/L    Potassium 4.6 3.4 - 5.3 mmol/L    Chloride 105 94 - 109 mmol/L    Carbon Dioxide (CO2) 26 20 - 32 mmol/L    Anion Gap 5 3 - 14 mmol/L    Urea Nitrogen 40 (H) 7 - 30 mg/dL    Creatinine 1.01 0.66 - 1.25 mg/dL    Calcium 8.4 (L) 8.5 - 10.1 mg/dL    Glucose 102 (H) 70 - 99 mg/dL    Alkaline Phosphatase 111 40 - 150 U/L    AST 25 0 - 45 U/L    ALT 25 0 - 70 U/L    Protein Total 7.4 6.8 - 8.8 g/dL    Albumin 3.5 3.4 - 5.0 g/dL    Bilirubin Total 0.3 0.2 - 1.3 mg/dL    GFR Estimate 85 >60 mL/min/1.73m2   CBC with platelets and differential   Result Value Ref Range    WBC Count 6.7 4.0 - 11.0 10e3/uL    RBC Count 4.09 (L) 4.40 - 5.90 10e6/uL    Hemoglobin 13.0 (L) 13.3 - 17.7 g/dL    Hematocrit 38.8 (L) 40.0 - 53.0 %    MCV 95 78 - 100 fL    MCH 31.8 26.5 - 33.0 pg    MCHC 33.5 31.5 - 36.5 g/dL    RDW 13.2 10.0 - 15.0 %    Platelet Count 258 150 - 450 10e3/uL    % Neutrophils 56 %    % Lymphocytes 29 %    % Monocytes 8 %    % Eosinophils 6 %    % Basophils 1 %    % Immature Granulocytes 0 %    NRBCs per 100 WBC 0 <1 /100    Absolute Neutrophils 3.7 1.6 - 8.3 10e3/uL    Absolute Lymphocytes 1.9 0.8 - 5.3 10e3/uL    Absolute Monocytes 0.6 0.0 - 1.3 10e3/uL    Absolute Eosinophils 0.4 0.0 - 0.7 10e3/uL    Absolute Basophils 0.1 0.0 - 0.2 10e3/uL    Absolute Immature Granulocytes 0.0 <=0.0 10e3/uL    Absolute NRBCs 0.0 10e3/uL   XR Chest Port 1 View    Narrative    CHEST PORTABLE ONE VIEW   11/17/2021 4:19 PM     HISTORY: Cough.    COMPARISON: Chest x-ray on 4/14/2018.      Impression    IMPRESSION: Single AP view of the chest was obtained.  Cardiomediastinal silhouette is within normal limits. Small patchy  nodular opacity in the left lung base, could be infectious. No  significant  pleural effusion or pneumothorax.    LEWIS BRADLEY MD         SYSTEM ID:  [id number]       Medications   0.9% sodium chloride BOLUS (0 mLs Intravenous Stopped 11/17/21 1640)   dexamethasone PF (DECADRON) injection 10 mg (10 mg Intravenous Given 11/17/21 1524)       Assessments & Plan (with Medical Decision Making)  51-year-old male with COPD exacerbation.  Covid negative.  Small patchy opacity in left lung base.  Will cover with Levaquin.  Should follow-up with primary care in 1 week.  Return to ED if condition worsens or any other concern     I have reviewed the nursing notes.    I have reviewed the findings, diagnosis, plan and need for follow up with the patient.       New Prescriptions    ALBUTEROL (PROAIR HFA/PROVENTIL HFA/VENTOLIN HFA) 108 (90 BASE) MCG/ACT INHALER    Inhale 2 puffs into the lungs every 3 hours as needed for shortness of breath / dyspnea or wheezing    LEVAQUIN 750 MG TABLET    Take 1 tablet (750 mg) by mouth daily for 10 days    PREDNISONE (DELTASONE) 20 MG TABLET    Take 3 tablets (60 mg) by mouth daily for 5 days       Final diagnoses:   COPD exacerbation (H)       11/17/2021   [facility] EMERGENCY DEPT     Bob Hodge MD  11/17/21 0741

## 2021-11-18 ENCOUNTER — PATIENT OUTREACH (OUTPATIENT)
Dept: CARE COORDINATION | Facility: CLINIC | Age: 51
End: 2021-11-18
Payer: COMMERCIAL

## 2021-11-18 DIAGNOSIS — Z71.89 OTHER SPECIFIED COUNSELING: ICD-10-CM

## 2021-11-18 NOTE — PROGRESS NOTES
Clinic Care Coordination Contact  [facility]/Voicemail       Clinical Data: Care Coordinator Outreach  Outreach attempted x 1.  Unable to leave a message on patient's voicemail with call back information and requested return call.  Plan: Care Coordinator will try to reach patient again in 1-2 business days.    .Martha HYLTON Community Health Worker  Clinic Care Coordination  [facility]  Phone: [phone]

## 2021-11-19 NOTE — PROGRESS NOTES
Clinic Care Coordination Contact  [facility]/Voicemail    Clinical Data: Care Coordinator Outreach  Reason for referral: TCM outreach  Outreach attempted x 2.  Left message on patient's voicemail with call back information and requested return call.  Plan Care Coordinator will do no further outreaches at this time.    Monica Purcell   Community Health Worker   Connected Care Resource [facility]

## 2022-06-19 ENCOUNTER — HOSPITAL ENCOUNTER (EMERGENCY)
Facility: CLINIC | Age: 52
Discharge: HOME | End: 2022-06-19
Attending: EMERGENCY MEDICINE | Admitting: EMERGENCY MEDICINE
Payer: COMMERCIAL

## 2022-06-19 VITALS
DIASTOLIC BLOOD PRESSURE: 76 MMHG | HEART RATE: 78 BPM | TEMPERATURE: 98.1 F | OXYGEN SATURATION: 100 % | HEIGHT: 67 IN | SYSTOLIC BLOOD PRESSURE: 124 MMHG | BODY MASS INDEX: 24.33 KG/M2 | RESPIRATION RATE: 16 BRPM | WEIGHT: 155 LBS

## 2022-06-19 DIAGNOSIS — R31.29 MICROSCOPIC HEMATURIA: ICD-10-CM

## 2022-06-19 DIAGNOSIS — K62.3 RECTAL PROLAPSE: ICD-10-CM

## 2022-06-19 LAB
ALBUMIN SERPL-MCNC: 3.6 G/DL (ref 3.4–5)
ALBUMIN UR-MCNC: NEGATIVE MG/DL
ALP SERPL-CCNC: 92 U/L (ref 40–150)
ALT SERPL W P-5'-P-CCNC: 18 U/L (ref 0–70)
ANION GAP SERPL CALCULATED.3IONS-SCNC: 2 MMOL/L (ref 3–14)
APPEARANCE UR: CLEAR
AST SERPL W P-5'-P-CCNC: 8 U/L (ref 0–45)
BASOPHILS # BLD AUTO: 0.1 10E3/UL (ref 0–0.2)
BASOPHILS NFR BLD AUTO: 1 %
BILIRUB SERPL-MCNC: 0.5 MG/DL (ref 0.2–1.3)
BILIRUB UR QL STRIP: ABNORMAL
BUN SERPL-MCNC: 18 MG/DL (ref 7–30)
CALCIUM SERPL-MCNC: 8.7 MG/DL (ref 8.5–10.1)
CHLORIDE BLD-SCNC: 108 MMOL/L (ref 94–109)
CO2 SERPL-SCNC: 31 MMOL/L (ref 20–32)
COLOR UR AUTO: YELLOW
CREAT SERPL-MCNC: 0.97 MG/DL (ref 0.66–1.25)
EOSINOPHIL # BLD AUTO: 0.2 10E3/UL (ref 0–0.7)
EOSINOPHIL NFR BLD AUTO: 3 %
ERYTHROCYTE [DISTWIDTH] IN BLOOD BY AUTOMATED COUNT: 12.9 % (ref 10–15)
GFR SERPL CREATININE-BSD FRML MDRD: >90 ML/MIN/1.73M2
GLUCOSE BLD-MCNC: 90 MG/DL (ref 70–99)
GLUCOSE UR STRIP-MCNC: NEGATIVE MG/DL
HCT VFR BLD AUTO: 42.6 % (ref 40–53)
HGB BLD-MCNC: 14.4 G/DL (ref 13.3–17.7)
HGB UR QL STRIP: ABNORMAL
IMM GRANULOCYTES # BLD: 0 10E3/UL
IMM GRANULOCYTES NFR BLD: 0 %
KETONES UR STRIP-MCNC: NEGATIVE MG/DL
LEUKOCYTE ESTERASE UR QL STRIP: NEGATIVE
LIPASE SERPL-CCNC: 56 U/L (ref 73–393)
LYMPHOCYTES # BLD AUTO: 1.2 10E3/UL (ref 0.8–5.3)
LYMPHOCYTES NFR BLD AUTO: 21 %
MCH RBC QN AUTO: 32 PG (ref 26.5–33)
MCHC RBC AUTO-ENTMCNC: 33.8 G/DL (ref 31.5–36.5)
MCV RBC AUTO: 95 FL (ref 78–100)
MONOCYTES # BLD AUTO: 0.4 10E3/UL (ref 0–1.3)
MONOCYTES NFR BLD AUTO: 7 %
MUCOUS THREADS #/AREA URNS LPF: PRESENT /LPF
NEUTROPHILS # BLD AUTO: 3.8 10E3/UL (ref 1.6–8.3)
NEUTROPHILS NFR BLD AUTO: 68 %
NITRATE UR QL: NEGATIVE
NRBC # BLD AUTO: 0 10E3/UL
NRBC BLD AUTO-RTO: 0 /100
PH UR STRIP: 6 [PH] (ref 5–7)
PLATELET # BLD AUTO: 240 10E3/UL (ref 150–450)
POTASSIUM BLD-SCNC: 4.1 MMOL/L (ref 3.4–5.3)
PROT SERPL-MCNC: 7 G/DL (ref 6.8–8.8)
RBC # BLD AUTO: 4.5 10E6/UL (ref 4.4–5.9)
RBC URINE: 5 /HPF
SODIUM SERPL-SCNC: 141 MMOL/L (ref 133–144)
SP GR UR STRIP: 1.02 (ref 1–1.03)
UROBILINOGEN UR STRIP-MCNC: NORMAL MG/DL
WBC # BLD AUTO: 5.7 10E3/UL (ref 4–11)
WBC URINE: 1 /HPF

## 2022-06-19 PROCEDURE — 80053 COMPREHEN METABOLIC PANEL: CPT | Performed by: EMERGENCY MEDICINE

## 2022-06-19 PROCEDURE — 99282 EMERGENCY DEPT VISIT SF MDM: CPT | Performed by: EMERGENCY MEDICINE

## 2022-06-19 PROCEDURE — 85025 COMPLETE CBC W/AUTO DIFF WBC: CPT | Performed by: EMERGENCY MEDICINE

## 2022-06-19 PROCEDURE — 99283 EMERGENCY DEPT VISIT LOW MDM: CPT | Performed by: EMERGENCY MEDICINE

## 2022-06-19 PROCEDURE — 83690 ASSAY OF LIPASE: CPT | Performed by: EMERGENCY MEDICINE

## 2022-06-19 PROCEDURE — 81001 URINALYSIS AUTO W/SCOPE: CPT | Performed by: EMERGENCY MEDICINE

## 2022-06-19 PROCEDURE — 36415 COLL VENOUS BLD VENIPUNCTURE: CPT | Performed by: EMERGENCY MEDICINE

## 2022-06-19 NOTE — DISCHARGE INSTRUCTIONS
-The examination and blood work your abdominal concerns shows no concerns at this time.  All major organs are functioning normal.    -Referral was placed for colorectal specialist in the [place] location near [facility].  They should be contacting you shortly for an appointment.  You should be expecting a phone call.  Make sure that your phone number is currently listed correctly in the computer so they can reach out to you.   No

## 2022-06-19 NOTE — ED TRIAGE NOTES
Abd pain since 2014.  Getting worse.  States he had a prolapse and liver troubles.  Has dealt with through [facility], not there since September.  Hx meth use, stopped in 2013, states an 18 pack of beer lasts him a month     Triage Assessment     Row Name 06/19/22 0651       Triage Assessment (Adult)    Airway WDL WDL       Respiratory WDL    Respiratory WDL WDL       Skin Circulation/Temperature WDL    Skin Circulation/Temperature WDL WDL       Cardiac WDL    Cardiac WDL WDL       Peripheral/Neurovascular WDL    Peripheral Neurovascular WDL WDL       Cognitive/Neuro/Behavioral WDL    Cognitive/Neuro/Behavioral WDL WDL

## 2022-06-19 NOTE — ED PROVIDER NOTES
History     Chief Complaint   Patient presents with     Abdominal Pain     HPI  Misha Nicole is a 52 year old male significant past medical history for tobacco use disorder, mixed mood disorder, rectal prolapse, intermittent abdominal pain.    Rectal prolapse: Recurrent problem for many years.  Has been referred to the pelvic floor specially clinic.  Has not been using exercises.  Social embarrassment with continued prolapse.  Typically wears a pad and to pairs of underwear.  Did see a colorectal specialist who did recommend surgical repair but prefer that the patient quit smoking before procedure was completed.  Surgery was never completed.    Abdominal pain: Patient's had intermittent abdominal discomfort since 2014.  Work-up has been unremarkable.  He states he typically states that either stool or gas discomfort.  Has had an incidental finding for hemangioma which per imaging has been stable unrelated to his discomfort.    Weight loss.  Patient reports at 1 time he weighed over 220 pounds.  He believes he is lost about 70 pounds since 2014.  This been very slow and gradual and he believes is most like related to his activity and eating habits changing.    Mental health: Stable.  No recent crises.    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 No pertinent past medical history.    Past Surgical History:    Past Surgical History:   Procedure Laterality Date     COLONOSCOPY N/A 5/7/2018    Procedure: COMBINED COLONOSCOPY, SINGLE OR MULTIPLE BIOPSY/POLYPECTOMY BY BIOPSY;   "Colonoscopy with biopsy by biopsy forceps;  Surgeon: Nathaniel Cook DO;  Location: [facility]     COLONOSCOPY N/A 5/4/2021    Procedure: Colonoscopy, With Polypectomy And Biopsy;  Surgeon: Jesusita Bush DO;  Location: [facility]     COLONOSCOPY WITH CO2 INSUFFLATION N/A 5/4/2021    Procedure: COLONOSCOPY, WITH CO2 INSUFFLATION;  Surgeon: Jesusita Bush DO;  Location: [facility]     COMBINED ESOPHAGOSCOPY, GASTROSCOPY, DUODENOSCOPY (EGD) WITH CO2 INSUFFLATION N/A 5/4/2021    Procedure: ESOPHAGOGASTRODUODENOSCOPY, WITH CO2 INSUFFLATION;  Surgeon: Jesusita Bush DO;  Location: [facility]     ESOPHAGOSCOPY, GASTROSCOPY, DUODENOSCOPY (EGD), COMBINED N/A 5/4/2021    Procedure: Esophagogastroduodenoscopy, With Biopsy;  Surgeon: Jesusita Bush DO;  Location: [facility]     ORTHOPEDIC SURGERY  1985 &1996    Right & Left knee replacements       Family History:    History reviewed. No pertinent family history.    Social History:  Marital Status:  Single [1]  Social History     Tobacco Use     Smoking status: Current Every Day Smoker     Packs/day: 0.30     Types: Cigarettes     Smokeless tobacco: Current User     Types: Chew   Substance Use Topics     Alcohol use: Yes     Drug use: No        Medications:    albuterol (PROAIR HFA) 108 (90 Base) MCG/ACT Inhaler  albuterol (PROAIR HFA/PROVENTIL HFA/VENTOLIN HFA) 108 (90 Base) MCG/ACT inhaler  ibuprofen (ADVIL/MOTRIN) 200 MG tablet  omeprazole (PRILOSEC) 20 MG DR capsule  SUMAtriptan (IMITREX) 50 MG tablet          Review of Systems   All other systems reviewed and are negative.      Physical Exam   BP: 126/79  Pulse: 76  Temp: 98.1  F (36.7  C)  Resp: 16  Height: 170.2 cm (5' 7\")  Weight: 70.3 kg (155 lb)  SpO2: 100 %      Physical Exam  Vitals and nursing note reviewed.   Constitutional:       Appearance: He is not ill-appearing.   HENT:      Head: Normocephalic.      Nose: Nose normal.   Eyes:      Conjunctiva/sclera: Conjunctivae normal.   Cardiovascular:      Rate and Rhythm: " Normal rate.   Pulmonary:      Effort: Pulmonary effort is normal.   Abdominal:      General: Abdomen is flat. Bowel sounds are normal.      Palpations: Abdomen is soft. There is no hepatomegaly or splenomegaly.      Tenderness: There is no abdominal tenderness. There is no right CVA tenderness or left CVA tenderness.      Hernia: No hernia is present.   Genitourinary:     Comments: Does have some seepage from the rectum.  He is got some perirectal skin irritation from constant moisture exposure but at this time there is no active rectal prolapse.  Skin:     General: Skin is warm.      Capillary Refill: Capillary refill takes less than 2 seconds.      Findings: No rash.   Neurological:      General: No focal deficit present.      Mental Status: He is alert and oriented to person, place, and time.   Psychiatric:         Mood and Affect: Mood normal.         Behavior: Behavior normal.           ED Course                 Procedures                  No results found for this or any previous visit (from the past 24 hour(s)).    Medications - No data to display    Assessments & Plan (with Medical Decision Making)  Brain is 52 years of age.  He has had years of difficulty rectal prolapse.  He has been to the pelvic floor specially clinic.  He was advised to have corrective surgery but they were hesitant to do it unless he quit smoking.  He states is just too challenging to quit smoking after many years and would still like to consider surgical correction.  States this is social embarrassment and a hygiene issue where he is constantly getting rashes and irritation from the rectal seepage.  Prolapse comes out with any Valsalva maneuver including bowel movements.  We did refer him to colorectal surgery in [place] for consultation    Patient also been having some intermittent difficulty of pain in the right lower quadrant.  Is been going on since 2014 work-up in the past numerous occasions identified no specific etiology of  concern they thought maybe it is related to constipation/gas.  He does have a confirmed hemangioma liver which is incidental finding.  Recent has been having little bit of increased pain in the right lower quadrant.  He reported a weight loss of around 70 pounds dating back to 2014 but he believes this was more related to activity and lifestyle changes.  He has had no fever chills or night sweats.  On examination today his abdomen is flat.  He has no hepatosplenomegaly.  Active bowel sounds.  No palpable mass or hernia.  I could not reproduce any pain and he had no guarding or rebound.  7:50 AM  Reviewed lab results with patient.  Set patient up to see colorectal surgery.  I did note that urine had 5 RBCs.  With smoking history microscopic hematuria did recommend that he get his urine rechecked in 30 days.  If it remains positive for blood would recommend urology visit for consideration of cystoscopy in the clinic.       I have reviewed the nursing notes.    I have reviewed the findings, diagnosis, plan and need for follow up with the patient.      New Prescriptions    No medications on file       Final diagnoses:   Rectal prolapse   Microscopic hematuria       6/19/2022   [facility] EMERGENCY DEPT     Abhilash Daly, DO  06/19/22 0750       Abhilash Daly, DO  06/19/22 0882

## 2022-07-14 ENCOUNTER — HOSPITAL ENCOUNTER (EMERGENCY)
Facility: CLINIC | Age: 52
Discharge: HOME | End: 2022-07-14
Attending: FAMILY MEDICINE | Admitting: FAMILY MEDICINE
Payer: COMMERCIAL

## 2022-07-14 VITALS — WEIGHT: 155 LBS | BODY MASS INDEX: 24.33 KG/M2 | HEIGHT: 67 IN

## 2022-07-14 DIAGNOSIS — M25.561 CHRONIC PAIN OF RIGHT KNEE: ICD-10-CM

## 2022-07-14 DIAGNOSIS — G89.29 CHRONIC PAIN OF RIGHT KNEE: ICD-10-CM

## 2022-07-14 PROCEDURE — 99283 EMERGENCY DEPT VISIT LOW MDM: CPT | Performed by: FAMILY MEDICINE

## 2022-07-14 PROCEDURE — 99284 EMERGENCY DEPT VISIT MOD MDM: CPT | Performed by: FAMILY MEDICINE

## 2022-07-14 RX ORDER — OXYCODONE HYDROCHLORIDE 5 MG/1
5 TABLET ORAL EVERY 6 HOURS PRN
Qty: 6 TABLET | Refills: 0 | Status: SHIPPED | OUTPATIENT
Start: 2022-07-14

## 2022-07-14 NOTE — ED TRIAGE NOTES
R knee pain. Pt reports he had a surgical repair to R knee when he was 13, has been painful since. Pain increasing over the last few months.      Triage Assessment     Row Name 07/14/22 0336       Triage Assessment (Adult)    Airway WDL WDL       Respiratory WDL    Respiratory WDL WDL       Skin Circulation/Temperature WDL    Skin Circulation/Temperature WDL WDL       Cardiac WDL    Cardiac WDL WDL       Peripheral/Neurovascular WDL    Peripheral Neurovascular WDL WDL       Cognitive/Neuro/Behavioral WDL    Cognitive/Neuro/Behavioral WDL WDL

## 2022-07-14 NOTE — ED PROVIDER NOTES
History     Chief Complaint   Patient presents with     Knee Pain     HPI  Misha Nicole is a 52 year old male who presents with right knee pain has been going on for the past number of years.  Patient comes in tonight because he just wants to have this checked out.  He is having a little bit more pain tonight than he normally does.  Denies any recent trauma or doing anything strenuous.  Patient has been taking Aleve with no effect.  Denies any extremity numbness or tingling.  Patient states that he was unable to sleep here tonight.    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    Past Surgical History:    Past Surgical History:   Procedure Laterality Date     COLONOSCOPY N/A 5/7/2018    Procedure: COMBINED COLONOSCOPY, SINGLE OR MULTIPLE BIOPSY/POLYPECTOMY BY BIOPSY;  Colonoscopy with biopsy by biopsy forceps;  Surgeon: Nathaniel Cook DO;  Location:  GI     COLONOSCOPY N/A 5/4/2021    Procedure: Colonoscopy, With Polypectomy And Biopsy;  Surgeon: Jesusita Bush DO;  Location: [facility]     COLONOSCOPY WITH CO2 INSUFFLATION N/A 5/4/2021    Procedure: COLONOSCOPY, WITH CO2 INSUFFLATION;  Surgeon: Jesusita Bush DO;  Location: [facility]     COMBINED ESOPHAGOSCOPY, GASTROSCOPY, DUODENOSCOPY (EGD) WITH CO2 INSUFFLATION N/A 5/4/2021    Procedure: ESOPHAGOGASTRODUODENOSCOPY, WITH CO2 INSUFFLATION;  Surgeon: Jesusita Bush DO;  Location: [facility]     ESOPHAGOSCOPY, GASTROSCOPY, DUODENOSCOPY (EGD), COMBINED N/A 5/4/2021    Procedure:  "Esophagogastroduodenoscopy, With Biopsy;  Surgeon: Jesusita Bush DO;  Location:  OR     ORTHOPEDIC SURGERY  1985 &1996    Right & Left knee replacements       Family History:    No family history on file.    Social History:  Marital Status:  Single [1]  Social History     Tobacco Use     Smoking status: Current Every Day Smoker     Packs/day: 0.30     Types: Cigarettes     Smokeless tobacco: Current User     Types: Chew   Substance Use Topics     Alcohol use: Yes     Drug use: No        Medications:    oxyCODONE (ROXICODONE) 5 MG tablet  albuterol (PROAIR HFA) 108 (90 Base) MCG/ACT Inhaler  albuterol (PROAIR HFA/PROVENTIL HFA/VENTOLIN HFA) 108 (90 Base) MCG/ACT inhaler  ibuprofen (ADVIL/MOTRIN) 200 MG tablet  omeprazole (PRILOSEC) 20 MG DR capsule  SUMAtriptan (IMITREX) 50 MG tablet          Review of Systems   All other systems reviewed and are negative.      Physical Exam   Height: 170.2 cm (5' 7\")  Weight: 70.3 kg (155 lb)      Physical Exam  Vitals and nursing note reviewed.   Constitutional:       General: He is not in acute distress.     Appearance: Normal appearance. He is not ill-appearing.   HENT:      Head: Normocephalic.   Cardiovascular:      Rate and Rhythm: Normal rate.   Musculoskeletal:      Cervical back: Normal range of motion.      Right knee: No swelling, deformity, effusion, erythema, ecchymosis or lacerations. Normal range of motion.      Instability Tests: Anterior drawer test negative. Anterior Lachman test negative. Lateral Gypsy test positive.   Neurological:      Mental Status: He is alert.         ED Course                 Procedures      No results found for this or any previous visit (from the past 24 hour(s)).    Medications - No data to display     Patient presents with chronic knee pain.  Patient states that he has had multiple surgeries on his knee before and this is the reason for his chronic pain.  I recommend that he follow-up with an orthopedic surgeon to discuss better " options for his knee pain.  He may benefit from just an injection of his knee.  In the meantime going to try him on a short course of some steroids.  Patient was given a few pain medications.  Patient will follow-up if there is no improvement over the next few days.    Assessments & Plan (with Medical Decision Making)  Chronic right knee pain     I have reviewed the nursing notes.    I have reviewed the findings, diagnosis, plan and need for follow up with the patient.      New Prescriptions    OXYCODONE (ROXICODONE) 5 MG TABLET    Take 1 tablet (5 mg) by mouth every 6 hours as needed for severe pain       Final diagnoses:   Chronic pain of right knee       7/14/2022   [facility] EMERGENCY DEPT     Syd Blackwell MD  07/14/22 1017     178

## 2022-08-06 ENCOUNTER — HEALTH MAINTENANCE LETTER (OUTPATIENT)
Age: 52
End: 2022-08-06

## 2022-10-22 ENCOUNTER — HEALTH MAINTENANCE LETTER (OUTPATIENT)
Age: 52
End: 2022-10-22

## 2023-08-27 ENCOUNTER — HEALTH MAINTENANCE LETTER (OUTPATIENT)
Age: 53
End: 2023-08-27

## 2023-09-08 NOTE — PROGRESS NOTES
"       HPI:    Misha  presents today for a video visit to discuss difficulties with defecation, blood in the stool and bloating/reflux.    Has been going on since about 2017 where he will feel like something is pressing on his rectum - when he tries to have a BM he feels like he isn't able to empty himself completely.  Will sometimes pass hard stools but generally stools are softer.  Also notes blood in the stool occasionally.  Has episodes where he feels like his \"innards come out\" - most recently happened a few weeks ago while shopping.  When this happens he manually pushes his rectum back in.    Also reports bloating/reflux.  Has tried cutting back on carbonated beverages since his EGD.  Is also trying to quit smoking but admits its difficult.  Is using omeprazole although frequently forgets to take it until after he has eaten.    CT showed prominent jejunal veins concerning for varices as well as rectal wall thickening.     Used to drink heavily - has cut back recently because it aggravates his stomach.  Thinks most of his symptoms began when he was drinking heavily.    Thinks he is losing weight - admits to being able to eat without issue.     Colonoscopy with me in May with mild inflammation in the rectum with biopsies c/w prolapse changes    EGD with gastritis and duodenitis        Past Surgical History:   Procedure Laterality Date     COLONOSCOPY N/A 5/7/2018    Procedure: COMBINED COLONOSCOPY, SINGLE OR MULTIPLE BIOPSY/POLYPECTOMY BY BIOPSY;  Colonoscopy with biopsy by biopsy forceps;  Surgeon: Nathaniel Cook DO;  Location: [facility]     COLONOSCOPY N/A 5/4/2021    Procedure: Colonoscopy, With Polypectomy And Biopsy;  Surgeon: Jesusita Bush DO;  Location: [facility]     COLONOSCOPY WITH CO2 INSUFFLATION N/A 5/4/2021    Procedure: COLONOSCOPY, WITH CO2 INSUFFLATION;  Surgeon: Jesusita Bush DO;  Location: [facility]     COMBINED ESOPHAGOSCOPY, GASTROSCOPY, DUODENOSCOPY (EGD) WITH CO2 INSUFFLATION N/A " Will confirm dx, result note states \"Recommend repeat colonoscopy in 1 year due to hereditary colon cancer syndrome.\" pt also had polyps.    5/4/2021    Procedure: ESOPHAGOGASTRODUODENOSCOPY, WITH CO2 INSUFFLATION;  Surgeon: Jesusita Bush DO;  Location: [facility]     ESOPHAGOSCOPY, GASTROSCOPY, DUODENOSCOPY (EGD), COMBINED N/A 5/4/2021    Procedure: Esophagogastroduodenoscopy, With Biopsy;  Surgeon: Jesusita Bush DO;  Location: [facility]     ORTHOPEDIC SURGERY  1985 &1996    Right & Left knee replacements       No family history on file.    Social History     Tobacco Use     Smoking status: Current Every Day Smoker     Packs/day: 0.30     Types: Cigarettes     Smokeless tobacco: Current User     Types: Chew   Substance Use Topics     Alcohol use: Yes        O:    Gen: no acute distress  HEENT: NCAT  Neck: normal ROM  Resp: nonlabored breathing  Neuro: no gross deficits  Psych: appropriate mood and affect    CT ABDOMEN PELVIS W CONTRAST 3/26/2021 12:12 PM     CLINICAL HISTORY: Abdominal distension; Abdominal pain, acute,  nonlocalized     TECHNIQUE: CT scan of the abdomen and pelvis was performed following  injection of IV contrast. Multiplanar reformats were obtained. Dose  reduction techniques were used.  CONTRAST: Isovue-370, 75mL     COMPARISON: 4/14/2018     FINDINGS:   LOWER CHEST: Normal.     HEPATOBILIARY: Normal.     PANCREAS: Normal.     SPLEEN: Normal.     ADRENAL GLANDS: Normal.     KIDNEYS/BLADDER: Normal.     BOWEL: There are several mildly dilated draining veins in the jejunum  seen series 3 images 68 through 100, presumably varices. There is wall  thickening of the rectum with prominent perirectal veins. No  obstruction. Remainder of the bowel is unremarkable.     PELVIC ORGANS: Normal.     ADDITIONAL FINDINGS: None.     MUSCULOSKELETAL: Normal.                                                                      IMPRESSION:   1.  Wall thickening of the distal rectum with prominent perirectal  veins. This is indeterminant, and could be related to neoplasia, a  vascular lesion or prominent varices. Recommend direct visualization.  2.  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    EGD/c-scope 5/2021:  FINAL DIAGNOSIS:   A. DUODENAL BIOPSY:   - Duodenal mucosa with foveolar metaplasia and Brunner's gland prominence   consistent with peptic duodenitis   - Negative for celiac sprue and dysplasia     B. STOMACH, BIOPSY:   - Gastric antral and body type mucosa with mild chronic inflammation   - No H. pylori like organisms identified on routine staining   - Negative for intestinal metaplasia and dysplasia     C. TRANSVERSE COLON POLYP, POLYPECTOMY:   - Tubular adenoma; negative for high grade dysplasia     D. RECTUM, BIOPSY:   - Rectal mucosa with mild chronic inflammation, crypt glandular distortion    and smooth muscle hyperplasia   compatible with mucosal prolapse changes   - Negative for dysplasia     Assessment and Plan:    # rectal prolapse - will refer to pelvic floor clinic for further evaluation and treatment.  Did discuss managing constipation as well with daily miralax.  Patient does report loose stools with this, although question if this is actually overflow diarrhea - encouraged to continue with regular use if able    # concern for jejunal varices on CT - normal appearance of liver.  Will get abdominal US and check INR for further evaluation    # gastritis, duodenitis - continue PPI    RTC 3 months    Jesusita Bush DO     Video-Visit Details     Type of service:  Video Visit     Video Start Time: 10:03AM  Video End Time (time video stopped): 10:22 AM    Originating Location (pt. Location): car (not driving)     Distant Location (provider location):  [facility]      Mode of Communication:  Video Conference via [employer]

## 2024-10-20 ENCOUNTER — HEALTH MAINTENANCE LETTER (OUTPATIENT)
Age: 54
End: 2024-10-20

## (undated) DEVICE — PREP CHLORAPREP 26ML TINTED ORANGE  260815

## (undated) DEVICE — KIT ENDO FIRST STEP DISINFECTANT 200ML W/POUCH EP-4

## (undated) DEVICE — PAD CHUX UNDERPAD 23X24" 7136

## (undated) RX ORDER — FENTANYL CITRATE 50 UG/ML
INJECTION, SOLUTION INTRAMUSCULAR; INTRAVENOUS
Status: DISPENSED
Start: 2021-05-04

## (undated) RX ORDER — LIDOCAINE HYDROCHLORIDE 20 MG/ML
JELLY TOPICAL
Status: DISPENSED
Start: 2021-05-04

## (undated) RX ORDER — LIDOCAINE HYDROCHLORIDE 10 MG/ML
INJECTION, SOLUTION EPIDURAL; INFILTRATION; INTRACAUDAL; PERINEURAL
Status: DISPENSED
Start: 2018-05-07